# Patient Record
Sex: MALE | Race: WHITE | URBAN - METROPOLITAN AREA
[De-identification: names, ages, dates, MRNs, and addresses within clinical notes are randomized per-mention and may not be internally consistent; named-entity substitution may affect disease eponyms.]

---

## 2020-06-02 ENCOUNTER — TELEPHONE ENCOUNTER (OUTPATIENT)
Dept: URBAN - METROPOLITAN AREA TELEHEALTH 2 | Facility: TELEHEALTH | Age: 20
End: 2020-06-02

## 2020-06-09 ENCOUNTER — TELEPHONE ENCOUNTER (OUTPATIENT)
Dept: URBAN - METROPOLITAN AREA CLINIC 92 | Facility: CLINIC | Age: 20
End: 2020-06-09

## 2020-06-09 RX ORDER — OMEPRAZOLE AND SODIUM BICARBONATE 40; 1680 MG/1; MG/1
ONE PACKET VIA G-TUBE QD POWDER, FOR SUSPENSION ORAL ONCE A DAY
Qty: 90 | Refills: 2
Start: 2020-03-12

## 2020-06-24 ENCOUNTER — OFFICE VISIT (OUTPATIENT)
Dept: RURAL CLINIC 8 | Facility: CLINIC | Age: 20
End: 2020-06-24

## 2020-06-24 RX ORDER — CARBIDOPA TABLETS 25 MG/1
TABLET ORAL
Qty: 0 | Refills: 0 | Status: ACTIVE | COMMUNITY
Start: 1900-01-01

## 2020-06-24 RX ORDER — LEUCOVORIN CALCIUM 10 MG/1
TABLET ORAL
Qty: 0 | Refills: 0 | Status: ACTIVE | COMMUNITY
Start: 1900-01-01

## 2020-06-24 RX ORDER — MONTELUKAST SODIUM 5 MG/1
CHEW 2 TABLETS (10 MG) BY ORAL ROUTE ONCE DAILY IN THE EVENING TABLET, CHEWABLE ORAL 1
Qty: 0 | Refills: 0 | Status: ACTIVE | COMMUNITY
Start: 1900-01-01

## 2020-06-24 RX ORDER — CETIRIZINE HYDROCHLORIDE 5 MG/1
TABLET ORAL
Qty: 0 | Refills: 0 | Status: ACTIVE | COMMUNITY
Start: 1900-01-01

## 2020-06-24 RX ORDER — FLUTICASONE PROPIONATE 50 UG/1
SPRAY, METERED NASAL
Qty: 0 | Refills: 0 | Status: ACTIVE | COMMUNITY
Start: 1900-01-01

## 2020-06-24 RX ORDER — NYSTATIN 100000 [USP'U]/G
APPLY TO THE AFFECTED AREA(S) BY TOPICAL ROUTE 2 TIMES PER DAY CREAM TOPICAL 2
Qty: 1 | Refills: 5 | Status: ACTIVE | COMMUNITY
Start: 2020-02-10

## 2020-06-24 RX ORDER — BUDESONIDE 0.5 MG/2ML
SUSPENSION RESPIRATORY (INHALATION)
Qty: 0 | Refills: 0 | Status: ACTIVE | COMMUNITY
Start: 1900-01-01

## 2020-06-24 RX ORDER — DIAZEPAM 2 MG/1
TABLET ORAL
Qty: 0 | Refills: 0 | Status: ACTIVE | COMMUNITY
Start: 1900-01-01

## 2020-06-24 RX ORDER — OMEPRAZOLE AND SODIUM BICARBONATE 40; 1680 MG/1; MG/1
ONE PACKET VIA G-TUBE QD POWDER, FOR SUSPENSION ORAL ONCE A DAY
Qty: 90 | Refills: 2 | Status: ACTIVE | COMMUNITY
Start: 2020-03-12

## 2020-06-24 RX ORDER — TRIHEXYPHENIDYL HYDROCHLORIDE 2 MG/1
TABLET ORAL
Qty: 0 | Refills: 0 | Status: ACTIVE | COMMUNITY
Start: 1900-01-01

## 2020-06-24 RX ORDER — BACLOFEN 10 MG/1
TABLET ORAL
Qty: 0 | Refills: 0 | Status: ACTIVE | COMMUNITY
Start: 1900-01-01

## 2020-06-24 RX ORDER — ECONAZOLE NITRATE 10 MG/G
APPLY TO THE AFFECTED AND SURROUNDING AREAS OF SKIN BY TOPICAL ROUTE 2 TIMES PER DAY IN THE MORNING AND EVENING CREAM TOPICAL 2
Qty: 1 | Refills: 2 | Status: ACTIVE | COMMUNITY
Start: 2019-11-14

## 2020-06-26 ENCOUNTER — TELEPHONE ENCOUNTER (OUTPATIENT)
Dept: URBAN - METROPOLITAN AREA CLINIC 92 | Facility: CLINIC | Age: 20
End: 2020-06-26

## 2020-06-26 RX ORDER — OMEPRAZOLE AND SODIUM BICARBONATE 40; 1680 MG/1; MG/1
ONE PACKET VIA G-TUBE QD POWDER, FOR SUSPENSION ORAL ONCE A DAY
Qty: 90 | Refills: 2
Start: 2020-03-12

## 2020-06-26 RX ORDER — OMEPRAZOLE AND SODIUM BICARBONATE 40; 1680 MG/1; MG/1
ONE PACKET VIA G-TUBE QD POWDER, FOR SUSPENSION ORAL ONCE A DAY
Qty: 90 | Refills: 3
Start: 2020-03-12

## 2020-06-26 RX ORDER — NYSTATIN 100000 [USP'U]/G
APPLY TO THE AFFECTED AREA(S) BY TOPICAL ROUTE 2 TIMES PER DAY CREAM TOPICAL 2
Qty: 1
Start: 2020-02-10

## 2020-07-08 ENCOUNTER — OFFICE VISIT (OUTPATIENT)
Dept: RURAL CLINIC 8 | Facility: CLINIC | Age: 20
End: 2020-07-08

## 2020-07-08 ENCOUNTER — CLAIMS CREATED FROM THE CLAIM WINDOW (OUTPATIENT)
Dept: RURAL CLINIC 8 | Facility: CLINIC | Age: 20
End: 2020-07-08
Payer: MEDICAID

## 2020-07-08 ENCOUNTER — OFFICE VISIT (OUTPATIENT)
Dept: RURAL CLINIC 8 | Facility: CLINIC | Age: 20
End: 2020-07-08
Payer: MEDICAID

## 2020-07-08 DIAGNOSIS — K21.9 GERD: ICD-10-CM

## 2020-07-08 DIAGNOSIS — Z43.1 ATTENTION TO GASTROSTOMY: ICD-10-CM

## 2020-07-08 DIAGNOSIS — G80.9 CEREBRAL PALSY: ICD-10-CM

## 2020-07-08 DIAGNOSIS — R13.10 DYSPHAGIA: ICD-10-CM

## 2020-07-08 PROCEDURE — G8427 DOCREV CUR MEDS BY ELIG CLIN: HCPCS | Performed by: INTERNAL MEDICINE

## 2020-07-08 PROCEDURE — G8417 CALC BMI ABV UP PARAM F/U: HCPCS | Performed by: INTERNAL MEDICINE

## 2020-07-08 PROCEDURE — G9903 PT SCRN TBCO ID AS NON USER: HCPCS | Performed by: INTERNAL MEDICINE

## 2020-07-08 PROCEDURE — 99213 OFFICE O/P EST LOW 20 MIN: CPT | Performed by: INTERNAL MEDICINE

## 2020-07-08 RX ORDER — ECONAZOLE NITRATE 10 MG/G
APPLY TO THE AFFECTED AND SURROUNDING AREAS OF SKIN BY TOPICAL ROUTE 2 TIMES PER DAY IN THE MORNING AND EVENING CREAM TOPICAL 2
Qty: 1 | Refills: 2 | Status: ACTIVE | COMMUNITY
Start: 2019-11-14

## 2020-07-08 RX ORDER — TRIHEXYPHENIDYL HYDROCHLORIDE 2 MG/1
TABLET ORAL
Qty: 0 | Refills: 0 | Status: ACTIVE | COMMUNITY
Start: 1900-01-01

## 2020-07-08 RX ORDER — BACLOFEN 10 MG/1
TABLET ORAL
Qty: 0 | Refills: 0 | Status: DISCONTINUED | COMMUNITY
Start: 1900-01-01

## 2020-07-08 RX ORDER — NYSTATIN 100000 [USP'U]/G
APPLY TO THE AFFECTED AREA(S) BY TOPICAL ROUTE 2 TIMES PER DAY CREAM TOPICAL 2
Qty: 1 | Status: DISCONTINUED | COMMUNITY
Start: 2020-02-10

## 2020-07-08 RX ORDER — CETIRIZINE HYDROCHLORIDE 5 MG/1
TABLET ORAL
Qty: 0 | Refills: 0 | Status: ACTIVE | COMMUNITY
Start: 1900-01-01

## 2020-07-08 RX ORDER — DIAZEPAM 2 MG/1
TABLET ORAL
Qty: 0 | Refills: 0 | Status: ACTIVE | COMMUNITY
Start: 1900-01-01

## 2020-07-08 RX ORDER — FLUTICASONE PROPIONATE 50 UG/1
SPRAY, METERED NASAL
Qty: 0 | Refills: 0 | Status: ACTIVE | COMMUNITY
Start: 1900-01-01

## 2020-07-08 RX ORDER — BUDESONIDE 0.5 MG/2ML
SUSPENSION RESPIRATORY (INHALATION)
Qty: 0 | Refills: 0 | Status: ACTIVE | COMMUNITY
Start: 1900-01-01

## 2020-07-08 RX ORDER — LEUCOVORIN CALCIUM 10 MG/1
TABLET ORAL
Qty: 0 | Refills: 0 | Status: ACTIVE | COMMUNITY
Start: 1900-01-01

## 2020-07-08 RX ORDER — CARBIDOPA TABLETS 25 MG/1
TABLET ORAL
Qty: 0 | Refills: 0 | Status: ACTIVE | COMMUNITY
Start: 1900-01-01

## 2020-07-08 RX ORDER — OMEPRAZOLE AND SODIUM BICARBONATE 40; 1680 MG/1; MG/1
ONE PACKET VIA G-TUBE QD POWDER, FOR SUSPENSION ORAL ONCE A DAY
Qty: 90 | Refills: 2 | Status: ACTIVE | COMMUNITY
Start: 2020-03-12

## 2020-07-08 RX ORDER — MONTELUKAST SODIUM 5 MG/1
CHEW 2 TABLETS (10 MG) BY ORAL ROUTE ONCE DAILY IN THE EVENING TABLET, CHEWABLE ORAL 1
Qty: 0 | Refills: 0 | Status: ACTIVE | COMMUNITY
Start: 1900-01-01

## 2020-07-08 NOTE — HPI-TODAY'S VISIT:
Karson comes to the office today with his mother and grandfather in follow-up.  He has advanced cerebral palsy and is dependent on a button PEG tube for feedings.  Comes for PEG tube exchange.

## 2020-10-21 ENCOUNTER — OFFICE VISIT (OUTPATIENT)
Dept: RURAL CLINIC 8 | Facility: CLINIC | Age: 20
End: 2020-10-21
Payer: MEDICAID

## 2020-10-21 DIAGNOSIS — Z43.1 ATTENTION TO GASTROSTOMY TUBE: ICD-10-CM

## 2020-10-21 DIAGNOSIS — G80.9 CEREBRAL PALSY: ICD-10-CM

## 2020-10-21 DIAGNOSIS — R13.10 DYSPHAGIA: ICD-10-CM

## 2020-10-21 DIAGNOSIS — K21.9 GERD: ICD-10-CM

## 2020-10-21 PROCEDURE — G8484 FLU IMMUNIZE NO ADMIN: HCPCS | Performed by: INTERNAL MEDICINE

## 2020-10-21 PROCEDURE — G9903 PT SCRN TBCO ID AS NON USER: HCPCS | Performed by: INTERNAL MEDICINE

## 2020-10-21 PROCEDURE — G8420 CALC BMI NORM PARAMETERS: HCPCS | Performed by: INTERNAL MEDICINE

## 2020-10-21 PROCEDURE — G8427 DOCREV CUR MEDS BY ELIG CLIN: HCPCS | Performed by: INTERNAL MEDICINE

## 2020-10-21 PROCEDURE — 99213 OFFICE O/P EST LOW 20 MIN: CPT | Performed by: INTERNAL MEDICINE

## 2020-10-21 RX ORDER — CETIRIZINE HYDROCHLORIDE 5 MG/1
TABLET ORAL
Qty: 0 | Refills: 0 | Status: ACTIVE | COMMUNITY
Start: 1900-01-01

## 2020-10-21 RX ORDER — CARBIDOPA TABLETS 25 MG/1
TABLET ORAL
Qty: 0 | Refills: 0 | Status: ACTIVE | COMMUNITY
Start: 1900-01-01

## 2020-10-21 RX ORDER — LEUCOVORIN CALCIUM 10 MG/1
TABLET ORAL
Qty: 0 | Refills: 0 | Status: ACTIVE | COMMUNITY
Start: 1900-01-01

## 2020-10-21 RX ORDER — MONTELUKAST SODIUM 5 MG/1
CHEW 2 TABLETS (10 MG) BY ORAL ROUTE ONCE DAILY IN THE EVENING TABLET, CHEWABLE ORAL 1
Qty: 0 | Refills: 0 | Status: ACTIVE | COMMUNITY
Start: 1900-01-01

## 2020-10-21 RX ORDER — FLUTICASONE PROPIONATE 50 UG/1
SPRAY, METERED NASAL
Qty: 0 | Refills: 0 | Status: ACTIVE | COMMUNITY
Start: 1900-01-01

## 2020-10-21 RX ORDER — ECONAZOLE NITRATE 10 MG/G
APPLY TO THE AFFECTED AND SURROUNDING AREAS OF SKIN BY TOPICAL ROUTE 2 TIMES PER DAY IN THE MORNING AND EVENING CREAM TOPICAL 2
Qty: 1 | Refills: 2 | Status: ACTIVE | COMMUNITY
Start: 2019-11-14

## 2020-10-21 RX ORDER — TRIHEXYPHENIDYL HYDROCHLORIDE 2 MG/1
TABLET ORAL
Qty: 0 | Refills: 0 | Status: ACTIVE | COMMUNITY
Start: 1900-01-01

## 2020-10-21 RX ORDER — OMEPRAZOLE AND SODIUM BICARBONATE 40; 1680 MG/1; MG/1
ONE PACKET VIA G-TUBE QD POWDER, FOR SUSPENSION ORAL ONCE A DAY
Qty: 90 | Refills: 2 | Status: ACTIVE | COMMUNITY
Start: 2020-03-12

## 2020-10-21 RX ORDER — DIAZEPAM 2 MG/1
TABLET ORAL
Qty: 0 | Refills: 0 | Status: ACTIVE | COMMUNITY
Start: 1900-01-01

## 2020-10-21 RX ORDER — BUDESONIDE 0.5 MG/2ML
SUSPENSION RESPIRATORY (INHALATION)
Qty: 0 | Refills: 0 | Status: ACTIVE | COMMUNITY
Start: 1900-01-01

## 2020-10-21 NOTE — HPI-TODAY'S VISIT:
The patient is seen today in the office with his mother and grandfather.  He has fairly advanced cerebral palsy and is dependent on a button gastrostomy for feedings.  He is doing well.

## 2021-01-13 ENCOUNTER — OFFICE VISIT (OUTPATIENT)
Dept: RURAL CLINIC 8 | Facility: CLINIC | Age: 21
End: 2021-01-13
Payer: MEDICAID

## 2021-01-13 ENCOUNTER — WEB ENCOUNTER (OUTPATIENT)
Dept: RURAL CLINIC 8 | Facility: CLINIC | Age: 21
End: 2021-01-13

## 2021-01-13 DIAGNOSIS — R13.19 CERVICAL DYSPHAGIA: ICD-10-CM

## 2021-01-13 DIAGNOSIS — Z43.1 ATTENTION TO GASTROSTOMY: ICD-10-CM

## 2021-01-13 DIAGNOSIS — K21.9 GERD: ICD-10-CM

## 2021-01-13 DIAGNOSIS — G80.9 CEREBRAL PALSY: ICD-10-CM

## 2021-01-13 PROBLEM — 40739000 DYSPHAGIA: Status: ACTIVE | Noted: 2020-10-21

## 2021-01-13 PROBLEM — 128188000 CEREBRAL PALSY: Status: ACTIVE | Noted: 2020-10-21

## 2021-01-13 PROCEDURE — 43762 RPLC GTUBE NO REVJ TRC: CPT | Performed by: INTERNAL MEDICINE

## 2021-01-13 PROCEDURE — G8417 CALC BMI ABV UP PARAM F/U: HCPCS | Performed by: INTERNAL MEDICINE

## 2021-01-13 PROCEDURE — 49446 CHANGE G-TUBE TO G-J PERC: CPT | Performed by: INTERNAL MEDICINE

## 2021-01-13 PROCEDURE — G9903 PT SCRN TBCO ID AS NON USER: HCPCS | Performed by: INTERNAL MEDICINE

## 2021-01-13 PROCEDURE — 91299 UNLISTED DX GI PROCEDURE: CPT | Performed by: INTERNAL MEDICINE

## 2021-01-13 PROCEDURE — G8482 FLU IMMUNIZE ORDER/ADMIN: HCPCS | Performed by: INTERNAL MEDICINE

## 2021-01-13 PROCEDURE — G8427 DOCREV CUR MEDS BY ELIG CLIN: HCPCS | Performed by: INTERNAL MEDICINE

## 2021-01-13 PROCEDURE — 1036F TOBACCO NON-USER: CPT | Performed by: INTERNAL MEDICINE

## 2021-01-13 PROCEDURE — 99213 OFFICE O/P EST LOW 20 MIN: CPT | Performed by: INTERNAL MEDICINE

## 2021-01-13 RX ORDER — OMEPRAZOLE AND SODIUM BICARBONATE 40; 1680 MG/1; MG/1
ONE PACKET VIA G-TUBE QD POWDER, FOR SUSPENSION ORAL ONCE A DAY
Qty: 90 | Refills: 2 | Status: ACTIVE | COMMUNITY
Start: 2020-03-12

## 2021-01-13 RX ORDER — CARBIDOPA TABLETS 25 MG/1
TABLET ORAL
Qty: 0 | Refills: 0 | Status: ACTIVE | COMMUNITY
Start: 1900-01-01

## 2021-01-13 RX ORDER — LEUCOVORIN CALCIUM 10 MG/1
TABLET ORAL
Qty: 0 | Refills: 0 | Status: ACTIVE | COMMUNITY
Start: 1900-01-01

## 2021-01-13 RX ORDER — FLUTICASONE PROPIONATE 50 UG/1
SPRAY, METERED NASAL
Qty: 0 | Refills: 0 | Status: ACTIVE | COMMUNITY
Start: 1900-01-01

## 2021-01-13 RX ORDER — CETIRIZINE HYDROCHLORIDE 5 MG/1
TABLET ORAL
Qty: 0 | Refills: 0 | Status: ACTIVE | COMMUNITY
Start: 1900-01-01

## 2021-01-13 RX ORDER — DIAZEPAM 2 MG/1
TABLET ORAL
Qty: 0 | Refills: 0 | Status: ACTIVE | COMMUNITY
Start: 1900-01-01

## 2021-01-13 RX ORDER — ECONAZOLE NITRATE 10 MG/G
APPLY TO THE AFFECTED AND SURROUNDING AREAS OF SKIN BY TOPICAL ROUTE 2 TIMES PER DAY IN THE MORNING AND EVENING CREAM TOPICAL 2
Qty: 1 | Refills: 2 | Status: ACTIVE | COMMUNITY
Start: 2019-11-14

## 2021-01-13 RX ORDER — BUDESONIDE 0.5 MG/2ML
SUSPENSION RESPIRATORY (INHALATION)
Qty: 0 | Refills: 0 | Status: ACTIVE | COMMUNITY
Start: 1900-01-01

## 2021-01-13 RX ORDER — MONTELUKAST SODIUM 5 MG/1
CHEW 2 TABLETS (10 MG) BY ORAL ROUTE ONCE DAILY IN THE EVENING TABLET, CHEWABLE ORAL 1
Qty: 0 | Refills: 0 | Status: ACTIVE | COMMUNITY
Start: 1900-01-01

## 2021-01-13 RX ORDER — TRIHEXYPHENIDYL HYDROCHLORIDE 2 MG/1
TABLET ORAL
Qty: 0 | Refills: 0 | Status: ACTIVE | COMMUNITY
Start: 1900-01-01

## 2021-01-13 NOTE — HPI-TODAY'S VISIT:
patient comes to the [place] office today with his mother and grandfather.  He has significantly advanced cerebral palsy.  He is depended on a button type gastrostomy for feedings.  He is doing actually quite well.  He is up to 139 lb.  He is tolerating tube feedings very well.  Not having any issues.  He does take  Zegerid daily via the gastrostomy.
18-Dec-2020 14:17

## 2021-01-25 ENCOUNTER — ERX REFILL RESPONSE (OUTPATIENT)
Dept: URBAN - METROPOLITAN AREA CLINIC 92 | Facility: CLINIC | Age: 21
End: 2021-01-25

## 2021-01-25 RX ORDER — OMEPRAZOLE AND SODIUM BICARBONATE 40; 1680 MG/1; MG/1
ONE PACKET VIA G-TUBE QD POWDER, FOR SUSPENSION ORAL ONCE A DAY
Qty: 90 | Refills: 3

## 2021-04-21 ENCOUNTER — OFFICE VISIT (OUTPATIENT)
Dept: RURAL CLINIC 8 | Facility: CLINIC | Age: 21
End: 2021-04-21
Payer: MEDICAID

## 2021-04-21 DIAGNOSIS — G40.909 SEIZURE DISORDER: ICD-10-CM

## 2021-04-21 DIAGNOSIS — K21.9 GASTROESOPHAGEAL REFLUX DISEASE, UNSPECIFIED WHETHER ESOPHAGITIS PRESENT: ICD-10-CM

## 2021-04-21 DIAGNOSIS — Z43.1 ATTENTION TO GASTROSTOMY: ICD-10-CM

## 2021-04-21 DIAGNOSIS — G80.1 SPASTIC DIPLEGIC CEREBRAL PALSY: ICD-10-CM

## 2021-04-21 PROBLEM — 58193001: Status: ACTIVE | Noted: 2021-04-21

## 2021-04-21 PROCEDURE — 99212 OFFICE O/P EST SF 10 MIN: CPT | Performed by: INTERNAL MEDICINE

## 2021-04-21 PROCEDURE — 43763 RPLC GTUBE REVJ GSTRST TRC: CPT | Performed by: INTERNAL MEDICINE

## 2021-04-21 RX ORDER — MONTELUKAST SODIUM 5 MG/1
CHEW 2 TABLETS (10 MG) BY ORAL ROUTE ONCE DAILY IN THE EVENING TABLET, CHEWABLE ORAL 1
Qty: 0 | Refills: 0 | Status: ACTIVE | COMMUNITY
Start: 1900-01-01

## 2021-04-21 RX ORDER — CARBIDOPA TABLETS 25 MG/1
TABLET ORAL
Qty: 0 | Refills: 0 | Status: ACTIVE | COMMUNITY
Start: 1900-01-01

## 2021-04-21 RX ORDER — CETIRIZINE HYDROCHLORIDE 5 MG/1
TABLET ORAL
Qty: 0 | Refills: 0 | Status: ACTIVE | COMMUNITY
Start: 1900-01-01

## 2021-04-21 RX ORDER — ECONAZOLE NITRATE 10 MG/G
APPLY TO THE AFFECTED AND SURROUNDING AREAS OF SKIN BY TOPICAL ROUTE 2 TIMES PER DAY IN THE MORNING AND EVENING CREAM TOPICAL 2
Qty: 1 | Refills: 2 | Status: ACTIVE | COMMUNITY
Start: 2019-11-14

## 2021-04-21 RX ORDER — TRIHEXYPHENIDYL HYDROCHLORIDE 2 MG/1
TABLET ORAL
Qty: 0 | Refills: 0 | Status: ACTIVE | COMMUNITY
Start: 1900-01-01

## 2021-04-21 RX ORDER — LEUCOVORIN CALCIUM 10 MG/1
TABLET ORAL
Qty: 0 | Refills: 0 | Status: ACTIVE | COMMUNITY
Start: 1900-01-01

## 2021-04-21 RX ORDER — OMEPRAZOLE AND SODIUM BICARBONATE 40; 1680 MG/1; MG/1
ONE PACKET VIA G-TUBE QD POWDER, FOR SUSPENSION ORAL ONCE A DAY
Qty: 90 | Refills: 3 | Status: ACTIVE | COMMUNITY

## 2021-04-21 RX ORDER — NYSTATIN 100000 [USP'U]/G
1 APPLICATION CREAM TOPICAL
Qty: 1 TUBE | Refills: 6 | OUTPATIENT
Start: 2021-04-21 | End: 2021-11-16

## 2021-04-21 RX ORDER — BUDESONIDE 0.5 MG/2ML
SUSPENSION RESPIRATORY (INHALATION)
Qty: 0 | Refills: 0 | Status: ACTIVE | COMMUNITY
Start: 1900-01-01

## 2021-04-21 RX ORDER — DIAZEPAM 2 MG/1
TABLET ORAL
Qty: 0 | Refills: 0 | Status: ACTIVE | COMMUNITY
Start: 1900-01-01

## 2021-04-21 RX ORDER — FLUTICASONE PROPIONATE 50 UG/1
SPRAY, METERED NASAL
Qty: 0 | Refills: 0 | Status: ACTIVE | COMMUNITY
Start: 1900-01-01

## 2021-04-21 NOTE — HPI-TODAY'S VISIT:
patient returns to the office with his grandfather for evaluation of gastrostomy change.  He has advanced cerebral palsy and is dependent on a button type gastrostomy for feedings.  He also has significant gastric acid reflux disease and is on is Zegerid.

## 2021-04-28 ENCOUNTER — OFFICE VISIT (OUTPATIENT)
Dept: RURAL CLINIC 8 | Facility: CLINIC | Age: 21
End: 2021-04-28

## 2021-08-25 ENCOUNTER — OFFICE VISIT (OUTPATIENT)
Dept: RURAL CLINIC 8 | Facility: CLINIC | Age: 21
End: 2021-08-25
Payer: MEDICAID

## 2021-08-25 DIAGNOSIS — R13.12 OROPHARYNGEAL DYSPHAGIA: ICD-10-CM

## 2021-08-25 DIAGNOSIS — G40.909 SEIZURE DISORDER: ICD-10-CM

## 2021-08-25 DIAGNOSIS — M62.838 MUSCLE SPASTICITY: ICD-10-CM

## 2021-08-25 DIAGNOSIS — G80.9 CEREBRAL PALSY, UNSPECIFIED TYPE: ICD-10-CM

## 2021-08-25 DIAGNOSIS — Z43.1 ATTENTION TO GASTROSTOMY: ICD-10-CM

## 2021-08-25 PROCEDURE — 99213 OFFICE O/P EST LOW 20 MIN: CPT | Performed by: INTERNAL MEDICINE

## 2021-08-25 PROCEDURE — 43763 RPLC GTUBE REVJ GSTRST TRC: CPT | Performed by: INTERNAL MEDICINE

## 2021-08-25 RX ORDER — OMEPRAZOLE AND SODIUM BICARBONATE 40; 1680 MG/1; MG/1
ONE PACKET VIA G-TUBE QD POWDER, FOR SUSPENSION ORAL ONCE A DAY
Qty: 90 | Refills: 3 | Status: ACTIVE | COMMUNITY

## 2021-08-25 RX ORDER — NYSTATIN 100000 [USP'U]/G
1 APPLICATION CREAM TOPICAL
Qty: 1 TUBE | Refills: 6 | Status: ACTIVE | COMMUNITY
Start: 2021-04-21 | End: 2021-11-16

## 2021-08-25 RX ORDER — LEUCOVORIN CALCIUM 10 MG/1
TABLET ORAL
Qty: 0 | Refills: 0 | Status: ACTIVE | COMMUNITY
Start: 1900-01-01

## 2021-08-25 RX ORDER — CETIRIZINE HYDROCHLORIDE 5 MG/1
TABLET ORAL
Qty: 0 | Refills: 0 | Status: ACTIVE | COMMUNITY
Start: 1900-01-01

## 2021-08-25 RX ORDER — TRIHEXYPHENIDYL HYDROCHLORIDE 2 MG/1
TABLET ORAL
Qty: 0 | Refills: 0 | Status: ACTIVE | COMMUNITY
Start: 1900-01-01

## 2021-08-25 RX ORDER — FLUTICASONE PROPIONATE 50 UG/1
SPRAY, METERED NASAL
Qty: 0 | Refills: 0 | Status: ACTIVE | COMMUNITY
Start: 1900-01-01

## 2021-08-25 RX ORDER — ECONAZOLE NITRATE 10 MG/G
APPLY TO THE AFFECTED AND SURROUNDING AREAS OF SKIN BY TOPICAL ROUTE 2 TIMES PER DAY IN THE MORNING AND EVENING CREAM TOPICAL 2
Qty: 1 | Refills: 2 | Status: ACTIVE | COMMUNITY
Start: 2019-11-14

## 2021-08-25 RX ORDER — DIAZEPAM 2 MG/1
TABLET ORAL
Qty: 0 | Refills: 0 | Status: ACTIVE | COMMUNITY
Start: 1900-01-01

## 2021-08-25 RX ORDER — BUDESONIDE 0.5 MG/2ML
SUSPENSION RESPIRATORY (INHALATION)
Qty: 0 | Refills: 0 | Status: ACTIVE | COMMUNITY
Start: 1900-01-01

## 2021-08-25 RX ORDER — MONTELUKAST SODIUM 5 MG/1
CHEW 2 TABLETS (10 MG) BY ORAL ROUTE ONCE DAILY IN THE EVENING TABLET, CHEWABLE ORAL 1
Qty: 0 | Refills: 0 | Status: ACTIVE | COMMUNITY
Start: 1900-01-01

## 2021-08-25 RX ORDER — CARBIDOPA TABLETS 25 MG/1
TABLET ORAL
Qty: 0 | Refills: 0 | Status: ACTIVE | COMMUNITY
Start: 1900-01-01

## 2021-08-25 NOTE — PHYSICAL EXAM GASTROINTESTINAL
Abdomen , soft, nontender, nondistended , no guarding or rigidity , no masses palpable , normal bowel sounds , Liver and Spleen , no hepatomegaly present , no hepatosplenomegaly , liver nontender , spleen not palpable  button peg in LUQ. c/d i

## 2021-08-25 NOTE — HPI-TODAY'S VISIT:
Karson comes today with his mother and grandfather. he has significant cerebral palsy, is noncommunicative. he has a Button PEG for feeding and medications. he is on 60mg free water with [employer] HN 250cc five times a day. there is 300 calories in each feeding for a total of 1500 calories a day. - his family does give him occasionally a few soft things like yogurt or banana pudding. he is able to swallow that with no problems. he does not cough or get choked. he is on zegerid daily for acid reflux. has not had any problems with constipation. does miralax every day to facilitate stooling.

## 2021-10-13 ENCOUNTER — ERX REFILL RESPONSE (OUTPATIENT)
Dept: RURAL CLINIC 2 | Facility: CLINIC | Age: 21
End: 2021-10-13

## 2021-10-13 RX ORDER — OMEPRAZOLE/SODIUM BICARBONATE 40; 1680 MG/1; MG/1
GIVE ONE PACKET VIA G-TUBE EVERY DAY POWDER, FOR SUSPENSION ORAL
Qty: 30 PACKET | Refills: 4 | OUTPATIENT

## 2021-10-13 RX ORDER — NYSTATIN 100000 [USP'U]/G
1 APPLICATION CREAM TOPICAL
Qty: 1 TUBE | Refills: 6 | OUTPATIENT

## 2021-10-13 RX ORDER — NYSTATIN CREAM 100000 [USP'U]/G
APPLY TO THE AFFECTED AREA AROUND GASTROSTOMY TWICE DAILY AS NEEDED CREAM TOPICAL
Qty: 30 GRAM | Refills: 7 | OUTPATIENT

## 2021-10-13 RX ORDER — OMEPRAZOLE AND SODIUM BICARBONATE 40; 1680 MG/1; MG/1
ONE PACKET VIA G-TUBE QD POWDER, FOR SUSPENSION ORAL ONCE A DAY
Qty: 90 | Refills: 3 | OUTPATIENT

## 2021-11-10 ENCOUNTER — OFFICE VISIT (OUTPATIENT)
Dept: RURAL CLINIC 8 | Facility: CLINIC | Age: 21
End: 2021-11-10
Payer: MEDICAID

## 2021-11-10 DIAGNOSIS — G80.9 CEREBRAL PALSY, UNSPECIFIED TYPE: ICD-10-CM

## 2021-11-10 DIAGNOSIS — R13.11 ORAL PHASE DYSPHAGIA: ICD-10-CM

## 2021-11-10 DIAGNOSIS — K21.9 GERD WITHOUT ESOPHAGITIS: ICD-10-CM

## 2021-11-10 DIAGNOSIS — Z43.1 ATTENTION TO GASTROSTOMY: ICD-10-CM

## 2021-11-10 PROCEDURE — 99213 OFFICE O/P EST LOW 20 MIN: CPT | Performed by: INTERNAL MEDICINE

## 2021-11-10 PROCEDURE — 43763 RPLC GTUBE REVJ GSTRST TRC: CPT | Performed by: INTERNAL MEDICINE

## 2021-11-10 RX ORDER — ECONAZOLE NITRATE 10 MG/G
APPLY TO THE AFFECTED AND SURROUNDING AREAS OF SKIN BY TOPICAL ROUTE 2 TIMES PER DAY IN THE MORNING AND EVENING CREAM TOPICAL 2
Qty: 1 | Refills: 2 | Status: ACTIVE | COMMUNITY
Start: 2019-11-14

## 2021-11-10 RX ORDER — BUDESONIDE 0.5 MG/2ML
SUSPENSION RESPIRATORY (INHALATION)
Qty: 0 | Refills: 0 | Status: ACTIVE | COMMUNITY
Start: 1900-01-01

## 2021-11-10 RX ORDER — LEUCOVORIN CALCIUM 10 MG/1
TABLET ORAL
Qty: 0 | Refills: 0 | Status: ACTIVE | COMMUNITY
Start: 1900-01-01

## 2021-11-10 RX ORDER — TRIHEXYPHENIDYL HYDROCHLORIDE 2 MG/1
TABLET ORAL
Qty: 0 | Refills: 0 | Status: ACTIVE | COMMUNITY
Start: 1900-01-01

## 2021-11-10 RX ORDER — NYSTATIN CREAM 100000 [USP'U]/G
APPLY TO THE AFFECTED AREA AROUND GASTROSTOMY TWICE DAILY AS NEEDED CREAM TOPICAL
Qty: 30 GRAM | Refills: 7 | Status: ACTIVE | COMMUNITY

## 2021-11-10 RX ORDER — CARBIDOPA TABLETS 25 MG/1
TABLET ORAL
Qty: 0 | Refills: 0 | Status: ACTIVE | COMMUNITY
Start: 1900-01-01

## 2021-11-10 RX ORDER — CETIRIZINE HYDROCHLORIDE 5 MG/1
TABLET ORAL
Qty: 0 | Refills: 0 | Status: ACTIVE | COMMUNITY
Start: 1900-01-01

## 2021-11-10 RX ORDER — MONTELUKAST SODIUM 5 MG/1
CHEW 2 TABLETS (10 MG) BY ORAL ROUTE ONCE DAILY IN THE EVENING TABLET, CHEWABLE ORAL 1
Qty: 0 | Refills: 0 | Status: ACTIVE | COMMUNITY
Start: 1900-01-01

## 2021-11-10 RX ORDER — DIAZEPAM 2 MG/1
TABLET ORAL
Qty: 0 | Refills: 0 | Status: ACTIVE | COMMUNITY
Start: 1900-01-01

## 2021-11-10 RX ORDER — FLUTICASONE PROPIONATE 50 UG/1
SPRAY, METERED NASAL
Qty: 0 | Refills: 0 | Status: ACTIVE | COMMUNITY
Start: 1900-01-01

## 2021-11-10 RX ORDER — OMEPRAZOLE/SODIUM BICARBONATE 40; 1680 MG/1; MG/1
GIVE ONE PACKET VIA G-TUBE EVERY DAY POWDER, FOR SUSPENSION ORAL
Qty: 30 PACKET | Refills: 4 | Status: ACTIVE | COMMUNITY

## 2021-11-10 NOTE — HPI-TODAY'S VISIT:
patient has significant cerebral palsy with dysphagia and is dependent on a button type gastrostomy for feedings.  He returns to the office today for follow-up in to exchange his gastrostomy.  He is accompanied by his mom and grandfather today.  GERD seems to be well controlled zegerid

## 2021-11-24 ENCOUNTER — OFFICE VISIT (OUTPATIENT)
Dept: RURAL CLINIC 8 | Facility: CLINIC | Age: 21
End: 2021-11-24

## 2022-02-09 ENCOUNTER — OFFICE VISIT (OUTPATIENT)
Dept: RURAL CLINIC 8 | Facility: CLINIC | Age: 22
End: 2022-02-09
Payer: MEDICAID

## 2022-02-09 DIAGNOSIS — R13.12 OROPHARYNGEAL DYSPHAGIA: ICD-10-CM

## 2022-02-09 DIAGNOSIS — Z43.1 ATTENTION TO GASTROSTOMY TUBE: ICD-10-CM

## 2022-02-09 DIAGNOSIS — K21.9 GERD WITHOUT ESOPHAGITIS: ICD-10-CM

## 2022-02-09 DIAGNOSIS — G80.0 SPASTIC QUADRIPLEGIC CEREBRAL PALSY: ICD-10-CM

## 2022-02-09 PROCEDURE — 43763 RPLC GTUBE REVJ GSTRST TRC: CPT | Performed by: INTERNAL MEDICINE

## 2022-02-09 PROCEDURE — 99213 OFFICE O/P EST LOW 20 MIN: CPT | Performed by: INTERNAL MEDICINE

## 2022-02-09 RX ORDER — TRIHEXYPHENIDYL HYDROCHLORIDE 2 MG/1
TABLET ORAL
Qty: 0 | Refills: 0 | Status: ACTIVE | COMMUNITY
Start: 1900-01-01

## 2022-02-09 RX ORDER — DIAZEPAM 2 MG/1
TABLET ORAL
Qty: 0 | Refills: 0 | Status: ACTIVE | COMMUNITY
Start: 1900-01-01

## 2022-02-09 RX ORDER — CETIRIZINE HYDROCHLORIDE 5 MG/1
TABLET ORAL
Qty: 0 | Refills: 0 | Status: ACTIVE | COMMUNITY
Start: 1900-01-01

## 2022-02-09 RX ORDER — FLUTICASONE PROPIONATE 50 UG/1
SPRAY, METERED NASAL
Qty: 0 | Refills: 0 | Status: ACTIVE | COMMUNITY
Start: 1900-01-01

## 2022-02-09 RX ORDER — ECONAZOLE NITRATE 10 MG/G
APPLY TO THE AFFECTED AND SURROUNDING AREAS OF SKIN BY TOPICAL ROUTE 2 TIMES PER DAY IN THE MORNING AND EVENING CREAM TOPICAL 2
Qty: 1 | Refills: 2 | Status: ACTIVE | COMMUNITY
Start: 2019-11-14

## 2022-02-09 RX ORDER — LEUCOVORIN CALCIUM 10 MG/1
TABLET ORAL
Qty: 0 | Refills: 0 | Status: ACTIVE | COMMUNITY
Start: 1900-01-01

## 2022-02-09 RX ORDER — OMEPRAZOLE/SODIUM BICARBONATE 40; 1680 MG/1; MG/1
GIVE ONE PACKET VIA G-TUBE EVERY DAY POWDER, FOR SUSPENSION ORAL
Qty: 30 PACKET | Refills: 4 | Status: ACTIVE | COMMUNITY

## 2022-02-09 RX ORDER — BUDESONIDE 0.5 MG/2ML
SUSPENSION RESPIRATORY (INHALATION)
Qty: 0 | Refills: 0 | Status: ACTIVE | COMMUNITY
Start: 1900-01-01

## 2022-02-09 RX ORDER — CARBIDOPA TABLETS 25 MG/1
TABLET ORAL
Qty: 0 | Refills: 0 | Status: ACTIVE | COMMUNITY
Start: 1900-01-01

## 2022-02-09 RX ORDER — NYSTATIN CREAM 100000 [USP'U]/G
APPLY TO THE AFFECTED AREA AROUND GASTROSTOMY TWICE DAILY AS NEEDED CREAM TOPICAL
Qty: 30 GRAM | Refills: 7 | Status: ACTIVE | COMMUNITY

## 2022-02-09 RX ORDER — MONTELUKAST SODIUM 5 MG/1
CHEW 2 TABLETS (10 MG) BY ORAL ROUTE ONCE DAILY IN THE EVENING TABLET, CHEWABLE ORAL 1
Qty: 0 | Refills: 0 | Status: ACTIVE | COMMUNITY
Start: 1900-01-01

## 2022-02-10 ENCOUNTER — ERX REFILL RESPONSE (OUTPATIENT)
Dept: RURAL CLINIC 2 | Facility: CLINIC | Age: 22
End: 2022-02-10

## 2022-02-10 RX ORDER — OMEPRAZOLE/SODIUM BICARBONATE 40; 1680 MG/1; MG/1
GIVE ONE PACKET VIA G-TUBE EVERY DAY POWDER, FOR SUSPENSION ORAL
Qty: 30 PACKET | Refills: 4 | OUTPATIENT

## 2022-02-10 RX ORDER — OMEPRAZOLE/SODIUM BICARBONATE 40; 1680 MG/1; MG/1
GIVE ONE PACKET VIA G-TUBE EVERY DAY 30 POWDER, FOR SUSPENSION ORAL
Qty: 30 PACKET | Refills: 4 | OUTPATIENT

## 2022-05-11 ENCOUNTER — OFFICE VISIT (OUTPATIENT)
Dept: RURAL CLINIC 8 | Facility: CLINIC | Age: 22
End: 2022-05-11

## 2022-05-11 RX ORDER — LEUCOVORIN CALCIUM 10 MG/1
TABLET ORAL
Qty: 0 | Refills: 0 | COMMUNITY
Start: 1900-01-01

## 2022-05-11 RX ORDER — FLUTICASONE PROPIONATE 50 UG/1
SPRAY, METERED NASAL
Qty: 0 | Refills: 0 | COMMUNITY
Start: 1900-01-01

## 2022-05-11 RX ORDER — ECONAZOLE NITRATE 10 MG/G
APPLY TO THE AFFECTED AND SURROUNDING AREAS OF SKIN BY TOPICAL ROUTE 2 TIMES PER DAY IN THE MORNING AND EVENING CREAM TOPICAL 2
Qty: 1 | Refills: 2 | COMMUNITY
Start: 2019-11-14

## 2022-05-11 RX ORDER — DIAZEPAM 2 MG/1
TABLET ORAL
Qty: 0 | Refills: 0 | COMMUNITY
Start: 1900-01-01

## 2022-05-11 RX ORDER — TRIHEXYPHENIDYL HYDROCHLORIDE 2 MG/1
TABLET ORAL
Qty: 0 | Refills: 0 | COMMUNITY
Start: 1900-01-01

## 2022-05-11 RX ORDER — NYSTATIN CREAM 100000 [USP'U]/G
APPLY TO THE AFFECTED AREA AROUND GASTROSTOMY TWICE DAILY AS NEEDED CREAM TOPICAL
Qty: 30 GRAM | Refills: 7 | COMMUNITY

## 2022-05-11 RX ORDER — CETIRIZINE HYDROCHLORIDE 5 MG/1
TABLET ORAL
Qty: 0 | Refills: 0 | COMMUNITY
Start: 1900-01-01

## 2022-05-11 RX ORDER — CARBIDOPA TABLETS 25 MG/1
TABLET ORAL
Qty: 0 | Refills: 0 | COMMUNITY
Start: 1900-01-01

## 2022-05-11 RX ORDER — OMEPRAZOLE/SODIUM BICARBONATE 40; 1680 MG/1; MG/1
GIVE ONE PACKET VIA G-TUBE EVERY DAY 30 POWDER, FOR SUSPENSION ORAL
Qty: 30 PACKET | Refills: 4 | COMMUNITY

## 2022-05-11 RX ORDER — BUDESONIDE 0.5 MG/2ML
SUSPENSION RESPIRATORY (INHALATION)
Qty: 0 | Refills: 0 | COMMUNITY
Start: 1900-01-01

## 2022-05-11 RX ORDER — MONTELUKAST SODIUM 5 MG/1
CHEW 2 TABLETS (10 MG) BY ORAL ROUTE ONCE DAILY IN THE EVENING TABLET, CHEWABLE ORAL 1
Qty: 0 | Refills: 0 | COMMUNITY
Start: 1900-01-01

## 2022-05-17 ENCOUNTER — ERX REFILL RESPONSE (OUTPATIENT)
Dept: RURAL CLINIC 2 | Facility: CLINIC | Age: 22
End: 2022-05-17

## 2022-05-17 RX ORDER — NYSTATIN CREAM 100000 [USP'U]/G
APPLY TO THE AFFECTED AREA AROUND GASTROSTOMY TWICE DAILY AS NEEDED CREAM TOPICAL
Qty: 30 GRAM | Refills: 7 | OUTPATIENT

## 2022-05-17 RX ORDER — NYSTATIN CREAM 100000 [USP'U]/G
APPLY TO THE AFFECTED AREA AROUND GASTROSTOMY TWICE DAILY AS NEEDED 30 CREAM TOPICAL
Qty: 30 GRAM | Refills: 7 | OUTPATIENT

## 2022-07-12 ENCOUNTER — ERX REFILL RESPONSE (OUTPATIENT)
Dept: RURAL CLINIC 2 | Facility: CLINIC | Age: 22
End: 2022-07-12

## 2022-07-12 RX ORDER — OMEPRAZOLE/SODIUM BICARBONATE 40; 1680 MG/1; MG/1
GIVE ONE PACKET VIA G-TUBE EVERY DAY 30 POWDER, FOR SUSPENSION ORAL
Qty: 30 PACKET | Refills: 4 | OUTPATIENT

## 2022-07-12 RX ORDER — OMEPRAZOLE, SODIUM BICARBONATE 40; 1680 MG/1; MG/1
GIVE ONE PACKET VIA G-TUBE EVERY DAY POWDER, FOR SUSPENSION ORAL
Qty: 30 PACK | Refills: 3 | OUTPATIENT

## 2022-07-13 ENCOUNTER — OFFICE VISIT (OUTPATIENT)
Dept: RURAL CLINIC 8 | Facility: CLINIC | Age: 22
End: 2022-07-13

## 2022-07-13 RX ORDER — DIAZEPAM 2 MG/1
TABLET ORAL
Qty: 0 | Refills: 0 | Status: ACTIVE | COMMUNITY
Start: 1900-01-01

## 2022-07-13 RX ORDER — TRIHEXYPHENIDYL HYDROCHLORIDE 2 MG/1
TABLET ORAL
Qty: 0 | Refills: 0 | Status: ACTIVE | COMMUNITY
Start: 1900-01-01

## 2022-07-13 RX ORDER — NYSTATIN CREAM 100000 [USP'U]/G
APPLY TO THE AFFECTED AREA AROUND GASTROSTOMY TWICE DAILY AS NEEDED 30 CREAM TOPICAL
Qty: 30 GRAM | Refills: 7 | Status: ACTIVE | COMMUNITY

## 2022-07-13 RX ORDER — LEUCOVORIN CALCIUM 10 MG/1
TABLET ORAL
Qty: 0 | Refills: 0 | Status: ACTIVE | COMMUNITY
Start: 1900-01-01

## 2022-07-13 RX ORDER — MONTELUKAST SODIUM 5 MG/1
CHEW 2 TABLETS (10 MG) BY ORAL ROUTE ONCE DAILY IN THE EVENING TABLET, CHEWABLE ORAL 1
Qty: 0 | Refills: 0 | Status: ACTIVE | COMMUNITY
Start: 1900-01-01

## 2022-07-13 RX ORDER — CARBIDOPA TABLETS 25 MG/1
TABLET ORAL
Qty: 0 | Refills: 0 | Status: ACTIVE | COMMUNITY
Start: 1900-01-01

## 2022-07-13 RX ORDER — FLUTICASONE PROPIONATE 50 UG/1
SPRAY, METERED NASAL
Qty: 0 | Refills: 0 | Status: ACTIVE | COMMUNITY
Start: 1900-01-01

## 2022-07-13 RX ORDER — CETIRIZINE HYDROCHLORIDE 5 MG/1
TABLET ORAL
Qty: 0 | Refills: 0 | Status: ACTIVE | COMMUNITY
Start: 1900-01-01

## 2022-07-13 RX ORDER — OMEPRAZOLE/SODIUM BICARBONATE 40; 1680 MG/1; MG/1
GIVE ONE PACKET VIA G-TUBE EVERY DAY 30 POWDER, FOR SUSPENSION ORAL
Qty: 30 PACKET | Refills: 4 | Status: ACTIVE | COMMUNITY

## 2022-07-13 RX ORDER — ECONAZOLE NITRATE 10 MG/G
APPLY TO THE AFFECTED AND SURROUNDING AREAS OF SKIN BY TOPICAL ROUTE 2 TIMES PER DAY IN THE MORNING AND EVENING CREAM TOPICAL 2
Qty: 1 | Refills: 2 | Status: ACTIVE | COMMUNITY
Start: 2019-11-14

## 2022-07-13 RX ORDER — BUDESONIDE 0.5 MG/2ML
SUSPENSION RESPIRATORY (INHALATION)
Qty: 0 | Refills: 0 | Status: ACTIVE | COMMUNITY
Start: 1900-01-01

## 2022-09-02 NOTE — PHYSICAL EXAM NECK/THYROID:
normal appearance , without tenderness upon palpation , no deformities , trachea midline , Thyroid normal size , no thyroid nodules , no masses , no JVD , thyroid nontender 0

## 2022-10-05 ENCOUNTER — TELEPHONE ENCOUNTER (OUTPATIENT)
Dept: URBAN - METROPOLITAN AREA CLINIC 92 | Facility: CLINIC | Age: 22
End: 2022-10-05

## 2022-10-05 RX ORDER — POLYETHYLENE GLYCOL 3350 17 G
1 PACKET MIXED WITH 8 OUNCES OF FLUID POWDER IN PACKET (EA) ORAL ONCE A DAY
Qty: 90 | Refills: 3 | OUTPATIENT

## 2022-10-14 ENCOUNTER — TELEPHONE ENCOUNTER (OUTPATIENT)
Dept: RURAL CLINIC 8 | Facility: CLINIC | Age: 22
End: 2022-10-14

## 2022-10-26 ENCOUNTER — OFFICE VISIT (OUTPATIENT)
Dept: RURAL CLINIC 8 | Facility: CLINIC | Age: 22
End: 2022-10-26
Payer: MEDICAID

## 2022-10-26 VITALS
HEART RATE: 75 BPM | SYSTOLIC BLOOD PRESSURE: 120 MMHG | DIASTOLIC BLOOD PRESSURE: 80 MMHG | BODY MASS INDEX: 26.68 KG/M2 | HEIGHT: 62 IN | WEIGHT: 145 LBS | TEMPERATURE: 97.3 F

## 2022-10-26 DIAGNOSIS — R13.12 OROPHARYNGEAL DYSPHAGIA: ICD-10-CM

## 2022-10-26 DIAGNOSIS — Z93.1 GASTROSTOMY IN PLACE: ICD-10-CM

## 2022-10-26 DIAGNOSIS — K21.9 GERD WITHOUT ESOPHAGITIS: ICD-10-CM

## 2022-10-26 DIAGNOSIS — G80.0 SPASTIC QUADRIPLEGIC CEREBRAL PALSY: ICD-10-CM

## 2022-10-26 PROBLEM — 302109006: Status: ACTIVE | Noted: 2022-10-26

## 2022-10-26 PROBLEM — 71457002: Status: ACTIVE | Noted: 2021-08-25

## 2022-10-26 PROBLEM — 48721008: Status: ACTIVE | Noted: 2022-02-09

## 2022-10-26 PROCEDURE — 43763 RPLC GTUBE REVJ GSTRST TRC: CPT | Performed by: INTERNAL MEDICINE

## 2022-10-26 PROCEDURE — 99213 OFFICE O/P EST LOW 20 MIN: CPT | Performed by: INTERNAL MEDICINE

## 2022-10-26 RX ORDER — FLUTICASONE PROPIONATE 50 UG/1
SPRAY, METERED NASAL
Qty: 0 | Refills: 0 | Status: ACTIVE | COMMUNITY
Start: 1900-01-01

## 2022-10-26 RX ORDER — OMEPRAZOLE, SODIUM BICARBONATE 40; 1680 MG/1; MG/1
GIVE ONE PACKET VIA G-TUBE EVERY DAY POWDER, FOR SUSPENSION ORAL
Qty: 30 PACK | Refills: 3 | Status: ACTIVE | COMMUNITY

## 2022-10-26 RX ORDER — DIAZEPAM 2 MG/1
TABLET ORAL
Qty: 0 | Refills: 0 | Status: ACTIVE | COMMUNITY
Start: 1900-01-01

## 2022-10-26 RX ORDER — POLYETHYLENE GLYCOL 3350 17 G
1 PACKET MIXED WITH 8 OUNCES OF FLUID POWDER IN PACKET (EA) ORAL ONCE A DAY
Qty: 90 | Refills: 3 | Status: ACTIVE | COMMUNITY

## 2022-10-26 RX ORDER — BUDESONIDE 0.5 MG/2ML
SUSPENSION RESPIRATORY (INHALATION)
Qty: 0 | Refills: 0 | Status: ACTIVE | COMMUNITY
Start: 1900-01-01

## 2022-10-26 RX ORDER — TRIHEXYPHENIDYL HYDROCHLORIDE 2 MG/1
TABLET ORAL
Qty: 0 | Refills: 0 | Status: ACTIVE | COMMUNITY
Start: 1900-01-01

## 2022-10-26 RX ORDER — MONTELUKAST SODIUM 5 MG/1
CHEW 2 TABLETS (10 MG) BY ORAL ROUTE ONCE DAILY IN THE EVENING TABLET, CHEWABLE ORAL 1
Qty: 0 | Refills: 0 | Status: ACTIVE | COMMUNITY
Start: 1900-01-01

## 2022-10-26 RX ORDER — NYSTATIN CREAM 100000 [USP'U]/G
APPLY TO THE AFFECTED AREA AROUND GASTROSTOMY TWICE DAILY AS NEEDED 30 CREAM TOPICAL
Qty: 30 GRAM | Refills: 7 | Status: ACTIVE | COMMUNITY

## 2022-10-26 RX ORDER — OMEPRAZOLE/SODIUM BICARBONATE 40; 1680 MG/1; MG/1
GIVE ONE PACKET VIA G-TUBE EVERY DAY 30 POWDER, FOR SUSPENSION ORAL
Qty: 30 PACKET | Refills: 4 | Status: ACTIVE | COMMUNITY

## 2022-10-26 RX ORDER — CARBIDOPA TABLETS 25 MG/1
TABLET ORAL
Qty: 0 | Refills: 0 | Status: ACTIVE | COMMUNITY
Start: 1900-01-01

## 2022-10-26 RX ORDER — CETIRIZINE HYDROCHLORIDE 5 MG/1
TABLET ORAL
Qty: 0 | Refills: 0 | Status: ACTIVE | COMMUNITY
Start: 1900-01-01

## 2022-10-26 RX ORDER — LEUCOVORIN CALCIUM 10 MG/1
TABLET ORAL
Qty: 0 | Refills: 0 | Status: ACTIVE | COMMUNITY
Start: 1900-01-01

## 2022-10-26 RX ORDER — ECONAZOLE NITRATE 10 MG/G
APPLY TO THE AFFECTED AND SURROUNDING AREAS OF SKIN BY TOPICAL ROUTE 2 TIMES PER DAY IN THE MORNING AND EVENING CREAM TOPICAL 2
Qty: 1 | Refills: 2 | Status: ACTIVE | COMMUNITY
Start: 2019-11-14

## 2022-10-26 NOTE — HPI-TODAY'S VISIT:
patient has significant cerebral palsy with dysphagia and is dependent on a button type gastrostomy for feedings.  He returns to the office today for follow-up in to exchange his gastrostomy.  He is accompanied by his mom and grandfather today.  GERD seems to be well controlled zegerid -  his mother tells me that recent blood work showed high iron and high sodium.  They have increased his free water flushes.  Continues to tolerate feedings well

## 2022-10-28 ENCOUNTER — OFFICE VISIT (OUTPATIENT)
Dept: RURAL CLINIC 8 | Facility: CLINIC | Age: 22
End: 2022-10-28

## 2022-11-04 ENCOUNTER — ERX REFILL RESPONSE (OUTPATIENT)
Dept: RURAL CLINIC 2 | Facility: CLINIC | Age: 22
End: 2022-11-04

## 2022-11-04 RX ORDER — OMEPRAZOLE/SODIUM BICARBONATE 40; 1680 MG/1; MG/1
GIVE ONE PACKET VIA G-TUBE EVERY DAY POWDER, FOR SUSPENSION ORAL
Qty: 30 PACK | Refills: 3 | OUTPATIENT

## 2022-11-04 RX ORDER — OMEPRAZOLE, SODIUM BICARBONATE 40; 1680 MG/1; MG/1
GIVE ONE PACKET VIA G-TUBE EVERY DAY POWDER, FOR SUSPENSION ORAL
Qty: 30 PACK | Refills: 3 | OUTPATIENT

## 2022-12-02 ENCOUNTER — ERX REFILL RESPONSE (OUTPATIENT)
Dept: RURAL CLINIC 2 | Facility: CLINIC | Age: 22
End: 2022-12-02

## 2022-12-02 RX ORDER — NYSTATIN CREAM 100000 [USP'U]/G
APPLY TO THE AFFECTED AREA AROUND GASTROSTOMY TWICE DAILY AS NEEDED CREAM TOPICAL
Qty: 30 GRAM | Refills: 6 | OUTPATIENT

## 2022-12-02 RX ORDER — NYSTATIN CREAM 100000 [USP'U]/G
APPLY TO THE AFFECTED AREA AROUND GASTROSTOMY TWICE DAILY AS NEEDED 30 CREAM TOPICAL
Qty: 30 GRAM | Refills: 7 | OUTPATIENT

## 2022-12-29 ENCOUNTER — ERX REFILL RESPONSE (OUTPATIENT)
Dept: RURAL CLINIC 2 | Facility: CLINIC | Age: 22
End: 2022-12-29

## 2022-12-29 RX ORDER — POLYETHYLENE GLYCOL 3350 17 G/17G
MIX ONE CAPFUL IN 8OZ OF IN FLUID PER G-TUBE EVERY DAY POWDER, FOR SOLUTION ORAL
Qty: 510 GRAM | Refills: 3 | OUTPATIENT

## 2022-12-29 RX ORDER — POLYETHYLENE GLYCOL 3350 17 G
1 PACKET MIXED WITH 8 OUNCES OF FLUID POWDER IN PACKET (EA) ORAL ONCE A DAY
Qty: 90 | Refills: 3 | OUTPATIENT

## 2023-02-08 ENCOUNTER — OFFICE VISIT (OUTPATIENT)
Dept: RURAL CLINIC 8 | Facility: CLINIC | Age: 23
End: 2023-02-08
Payer: MEDICAID

## 2023-02-08 VITALS
DIASTOLIC BLOOD PRESSURE: 80 MMHG | HEIGHT: 62 IN | SYSTOLIC BLOOD PRESSURE: 120 MMHG | TEMPERATURE: 98 F | BODY MASS INDEX: 26.31 KG/M2 | WEIGHT: 143 LBS | HEART RATE: 75 BPM

## 2023-02-08 DIAGNOSIS — K21.9 GERD WITHOUT ESOPHAGITIS: ICD-10-CM

## 2023-02-08 DIAGNOSIS — Z43.1 ATTENTION TO GASTROSTOMY TUBE: ICD-10-CM

## 2023-02-08 DIAGNOSIS — G40.909 SEIZURE DISORDER: ICD-10-CM

## 2023-02-08 DIAGNOSIS — R13.11 ORAL PHASE DYSPHAGIA: ICD-10-CM

## 2023-02-08 DIAGNOSIS — G80.8 OTHER CEREBRAL PALSY: ICD-10-CM

## 2023-02-08 PROBLEM — 128613002 SEIZURE DISORDER: Status: ACTIVE | Noted: 2021-01-13

## 2023-02-08 PROBLEM — 266435005: Status: ACTIVE | Noted: 2021-11-10

## 2023-02-08 PROBLEM — 305058001: Status: ACTIVE | Noted: 2022-02-09

## 2023-02-08 PROBLEM — 429975007: Status: ACTIVE | Noted: 2021-11-10

## 2023-02-08 PROBLEM — 128188000: Status: ACTIVE | Noted: 2021-08-25

## 2023-02-08 PROCEDURE — 43763 RPLC GTUBE REVJ GSTRST TRC: CPT | Performed by: INTERNAL MEDICINE

## 2023-02-08 PROCEDURE — 99212 OFFICE O/P EST SF 10 MIN: CPT | Performed by: INTERNAL MEDICINE

## 2023-02-08 RX ORDER — OMEPRAZOLE/SODIUM BICARBONATE 40; 1680 MG/1; MG/1
GIVE ONE PACKET VIA G-TUBE EVERY DAY POWDER, FOR SUSPENSION ORAL
Qty: 30 PACK | Refills: 3 | Status: ACTIVE | COMMUNITY

## 2023-02-08 RX ORDER — ECONAZOLE NITRATE 10 MG/G
APPLY TO THE AFFECTED AND SURROUNDING AREAS OF SKIN BY TOPICAL ROUTE 2 TIMES PER DAY IN THE MORNING AND EVENING CREAM TOPICAL 2
Qty: 1 | Refills: 2 | Status: ACTIVE | COMMUNITY
Start: 2019-11-14

## 2023-02-08 RX ORDER — MONTELUKAST SODIUM 5 MG/1
CHEW 2 TABLETS (10 MG) BY ORAL ROUTE ONCE DAILY IN THE EVENING TABLET, CHEWABLE ORAL 1
Qty: 0 | Refills: 0 | Status: ACTIVE | COMMUNITY
Start: 1900-01-01

## 2023-02-08 RX ORDER — BUDESONIDE 0.5 MG/2ML
SUSPENSION RESPIRATORY (INHALATION)
Qty: 0 | Refills: 0 | Status: ACTIVE | COMMUNITY
Start: 1900-01-01

## 2023-02-08 RX ORDER — CARBIDOPA TABLETS 25 MG/1
TABLET ORAL
Qty: 0 | Refills: 0 | Status: ACTIVE | COMMUNITY
Start: 1900-01-01

## 2023-02-08 RX ORDER — TRIHEXYPHENIDYL HYDROCHLORIDE 2 MG/1
TABLET ORAL
Qty: 0 | Refills: 0 | Status: ACTIVE | COMMUNITY
Start: 1900-01-01

## 2023-02-08 RX ORDER — DIAZEPAM 2 MG/1
TABLET ORAL
Qty: 0 | Refills: 0 | Status: ACTIVE | COMMUNITY
Start: 1900-01-01

## 2023-02-08 RX ORDER — CETIRIZINE HYDROCHLORIDE 5 MG/1
TABLET ORAL
Qty: 0 | Refills: 0 | Status: ACTIVE | COMMUNITY
Start: 1900-01-01

## 2023-02-08 RX ORDER — NYSTATIN CREAM 100000 [USP'U]/G
APPLY TO THE AFFECTED AREA AROUND GASTROSTOMY TWICE DAILY AS NEEDED CREAM TOPICAL
Qty: 30 GRAM | Refills: 6 | Status: ACTIVE | COMMUNITY

## 2023-02-08 RX ORDER — LEUCOVORIN CALCIUM 10 MG/1
TABLET ORAL
Qty: 0 | Refills: 0 | Status: ACTIVE | COMMUNITY
Start: 1900-01-01

## 2023-02-08 RX ORDER — POLYETHYLENE GLYCOL 3350 17 G/17G
MIX ONE CAPFUL IN 8OZ OF IN FLUID PER G-TUBE EVERY DAY POWDER, FOR SOLUTION ORAL
Qty: 510 GRAM | Refills: 3 | Status: ACTIVE | COMMUNITY

## 2023-02-08 RX ORDER — FLUTICASONE PROPIONATE 50 UG/1
SPRAY, METERED NASAL
Qty: 0 | Refills: 0 | Status: ACTIVE | COMMUNITY
Start: 1900-01-01

## 2023-02-08 NOTE — HPI-TODAY'S VISIT:
patient has significant cerebral palsy with dysphagia and is dependent on a button type gastrostomy for feedings.  He returns to the office today for follow-up in to exchange his gastrostomy.  He is accompanied by his mom and grandfather today.  GERD seems to be well controlled zegerid -  his mother tells me that recent blood work showed high iron and high sodium.  They have increased his free water flushes.  Continues to tolerate feedings well -  02/08/2023: Karson comes with his mother and grandfather today for gastrostomy tube exchange.  His company recently change regarding the supplying of his gastrostomy tube feeding equipment.

## 2023-02-08 NOTE — PHYSICAL EXAM HENT:
Head, normocephalic, atraumatic, Face, Face within normal limits, Ears, External ears within normal limits
DISPLAY PLAN FREE TEXT

## 2023-02-27 ENCOUNTER — ERX REFILL RESPONSE (OUTPATIENT)
Dept: RURAL CLINIC 2 | Facility: CLINIC | Age: 23
End: 2023-02-27

## 2023-02-27 RX ORDER — OMEPRAZOLE/SODIUM BICARBONATE 40; 1680 MG/1; MG/1
GIVE ONE PACKET VIA G-TUBE EVERY DAY POWDER, FOR SUSPENSION ORAL
Qty: 30 PACKET | Refills: 3 | OUTPATIENT

## 2023-02-27 RX ORDER — OMEPRAZOLE/SODIUM BICARBONATE 40; 1680 MG/1; MG/1
GIVE ONE PACKET VIA G-TUBE EVERY DAY POWDER, FOR SUSPENSION ORAL
Qty: 30 PACK | Refills: 3 | OUTPATIENT

## 2023-04-10 NOTE — PHYSICAL EXAM CHEST:
PT NEEDS TO HAVE REFILL SENT TO [facility].    METHYLPHENIDATE HCI - 20MG ORAL TABLET(RITALIN); TAKE 1 TABLET 3x DAILY       no lesions,  no deformities,  no traumatic injuries,  no significant scars are present,  chest wall non-tender,  no masses present, breathing is unlabored without accessory muscle use, normal breath sounds

## 2023-05-01 ENCOUNTER — ERX REFILL RESPONSE (OUTPATIENT)
Dept: RURAL CLINIC 2 | Facility: CLINIC | Age: 23
End: 2023-05-01

## 2023-05-01 RX ORDER — POLYETHYLENE GLYCOL 3350 17 G/17G
MIX ONE CAPFUL IN 8OZ OF IN FLUID PER G-TUBE EVERY DAY POWDER, FOR SOLUTION ORAL
Qty: 510 GRAM | Refills: 3 | OUTPATIENT

## 2023-05-10 ENCOUNTER — LAB OUTSIDE AN ENCOUNTER (OUTPATIENT)
Dept: RURAL CLINIC 8 | Facility: CLINIC | Age: 23
End: 2023-05-10

## 2023-05-10 ENCOUNTER — OFFICE VISIT (OUTPATIENT)
Dept: RURAL CLINIC 8 | Facility: CLINIC | Age: 23
End: 2023-05-10
Payer: MEDICAID

## 2023-05-10 VITALS
SYSTOLIC BLOOD PRESSURE: 120 MMHG | DIASTOLIC BLOOD PRESSURE: 80 MMHG | TEMPERATURE: 99.3 F | BODY MASS INDEX: 26.68 KG/M2 | WEIGHT: 145 LBS | HEIGHT: 62 IN | HEART RATE: 75 BPM

## 2023-05-10 DIAGNOSIS — K94.23 COMPLICATION OF FEEDING TUBE: ICD-10-CM

## 2023-05-10 PROCEDURE — 43763 RPLC GTUBE REVJ GSTRST TRC: CPT | Performed by: INTERNAL MEDICINE

## 2023-05-10 PROCEDURE — 43762 RPLC GTUBE NO REVJ TRC: CPT | Performed by: INTERNAL MEDICINE

## 2023-05-10 PROCEDURE — 99212 OFFICE O/P EST SF 10 MIN: CPT | Performed by: INTERNAL MEDICINE

## 2023-05-10 RX ORDER — TRIHEXYPHENIDYL HYDROCHLORIDE 2 MG/1
TABLET ORAL
Qty: 0 | Refills: 0 | COMMUNITY
Start: 1900-01-01

## 2023-05-10 RX ORDER — MONTELUKAST SODIUM 5 MG/1
CHEW 2 TABLETS (10 MG) BY ORAL ROUTE ONCE DAILY IN THE EVENING TABLET, CHEWABLE ORAL 1
Qty: 0 | Refills: 0 | COMMUNITY
Start: 1900-01-01

## 2023-05-10 RX ORDER — BUDESONIDE 0.5 MG/2ML
SUSPENSION RESPIRATORY (INHALATION)
Qty: 0 | Refills: 0 | COMMUNITY
Start: 1900-01-01

## 2023-05-10 RX ORDER — LEUCOVORIN CALCIUM 10 MG/1
TABLET ORAL
Qty: 0 | Refills: 0 | COMMUNITY
Start: 1900-01-01

## 2023-05-10 RX ORDER — CETIRIZINE HYDROCHLORIDE 5 MG/1
TABLET ORAL
Qty: 0 | Refills: 0 | COMMUNITY
Start: 1900-01-01

## 2023-05-10 RX ORDER — NYSTATIN CREAM 100000 [USP'U]/G
APPLY TO THE AFFECTED AREA AROUND GASTROSTOMY TWICE DAILY AS NEEDED CREAM TOPICAL
Qty: 30 GRAM | Refills: 6 | COMMUNITY

## 2023-05-10 RX ORDER — FLUTICASONE PROPIONATE 50 UG/1
SPRAY, METERED NASAL
Qty: 0 | Refills: 0 | COMMUNITY
Start: 1900-01-01

## 2023-05-10 RX ORDER — OMEPRAZOLE/SODIUM BICARBONATE 40; 1680 MG/1; MG/1
GIVE ONE PACKET VIA G-TUBE EVERY DAY POWDER, FOR SUSPENSION ORAL
Qty: 30 PACKET | Refills: 3 | COMMUNITY

## 2023-05-10 RX ORDER — DIAZEPAM 2 MG/1
TABLET ORAL
Qty: 0 | Refills: 0 | COMMUNITY
Start: 1900-01-01

## 2023-05-10 RX ORDER — POLYETHYLENE GLYCOL 3350 17 G/17G
MIX ONE CAPFUL IN 8OZ OF IN FLUID PER G-TUBE EVERY DAY POWDER, FOR SOLUTION ORAL
Qty: 510 GRAM | Refills: 3 | COMMUNITY

## 2023-05-10 RX ORDER — CARBIDOPA TABLETS 25 MG/1
TABLET ORAL
Qty: 0 | Refills: 0 | COMMUNITY
Start: 1900-01-01

## 2023-05-10 RX ORDER — ECONAZOLE NITRATE 10 MG/G
APPLY TO THE AFFECTED AND SURROUNDING AREAS OF SKIN BY TOPICAL ROUTE 2 TIMES PER DAY IN THE MORNING AND EVENING CREAM TOPICAL 2
Qty: 1 | Refills: 2 | COMMUNITY
Start: 2019-11-14

## 2023-05-10 NOTE — HPI-TODAY'S VISIT:
patient has significant cerebral palsy with dysphagia and is dependent on a button type gastrostomy for feedings.  He returns to the office today for follow-up in to exchange his gastrostomy.  He is accompanied by his mom and grandfather today.  GERD seems to be well controlled zegerid -  his mother tells me that recent blood work showed high iron and high sodium.  They have increased his free water flushes.  Continues to tolerate feedings well -  02/08/2023: Karson comes with his mother and grandfather today for gastrostomy tube exchange.  His company recently change regarding the supplying of his gastrostomy tube feeding equipment. -  05/10/2023:  Karson comes to the office today with his mom and grandfather for gastrostomy Tube exchange. he has been recently seen by Dr. Abarca and some lab work has been drawn showing some elevation of hemoglobin.  He is scheduled to see Hematology about that

## 2023-06-28 ENCOUNTER — ERX REFILL RESPONSE (OUTPATIENT)
Dept: RURAL CLINIC 2 | Facility: CLINIC | Age: 23
End: 2023-06-28

## 2023-06-28 RX ORDER — NYSTATIN CREAM 100000 [USP'U]/G
APPLY TO THE AFFECTED AREA AROUND GASTROSTOMY TWICE DAILY AS NEEDED CREAM TOPICAL
Qty: 30 GRAM | Refills: 6 | OUTPATIENT

## 2023-06-28 RX ORDER — OMEPRAZOLE/SODIUM BICARBONATE 40; 1680 MG/1; MG/1
GIVE ONE PACKET VIA G-TUBE EVERY DAY POWDER, FOR SUSPENSION ORAL
Qty: 30 PACKET | Refills: 3 | OUTPATIENT

## 2023-08-11 ENCOUNTER — OFFICE VISIT (OUTPATIENT)
Dept: RURAL CLINIC 8 | Facility: CLINIC | Age: 23
End: 2023-08-11
Payer: MEDICAID

## 2023-08-11 ENCOUNTER — LAB OUTSIDE AN ENCOUNTER (OUTPATIENT)
Dept: RURAL CLINIC 8 | Facility: CLINIC | Age: 23
End: 2023-08-11

## 2023-08-11 VITALS
WEIGHT: 148 LBS | HEIGHT: 62 IN | BODY MASS INDEX: 27.23 KG/M2 | DIASTOLIC BLOOD PRESSURE: 57 MMHG | HEART RATE: 99 BPM | SYSTOLIC BLOOD PRESSURE: 99 MMHG | TEMPERATURE: 98.6 F

## 2023-08-11 DIAGNOSIS — G80.0 SPASTIC QUADRIPLEGIC CEREBRAL PALSY: ICD-10-CM

## 2023-08-11 DIAGNOSIS — R13.12 OROPHARYNGEAL DYSPHAGIA: ICD-10-CM

## 2023-08-11 DIAGNOSIS — K21.9 GERD WITHOUT ESOPHAGITIS: ICD-10-CM

## 2023-08-11 DIAGNOSIS — Z43.1 ATTENTION TO GASTROSTOMY: ICD-10-CM

## 2023-08-11 PROBLEM — 305058001: Status: ACTIVE | Noted: 2023-08-11

## 2023-08-11 PROCEDURE — 43763 RPLC GTUBE REVJ GSTRST TRC: CPT | Performed by: INTERNAL MEDICINE

## 2023-08-11 PROCEDURE — 43762 RPLC GTUBE NO REVJ TRC: CPT | Performed by: INTERNAL MEDICINE

## 2023-08-11 PROCEDURE — 99213 OFFICE O/P EST LOW 20 MIN: CPT | Performed by: INTERNAL MEDICINE

## 2023-08-11 RX ORDER — ECONAZOLE NITRATE 10 MG/G
APPLY TO THE AFFECTED AND SURROUNDING AREAS OF SKIN BY TOPICAL ROUTE 2 TIMES PER DAY IN THE MORNING AND EVENING CREAM TOPICAL 2
Qty: 1 | Refills: 2 | Status: ACTIVE | COMMUNITY
Start: 2019-11-14

## 2023-08-11 RX ORDER — DIAZEPAM 2 MG/1
TABLET ORAL
Qty: 0 | Refills: 0 | Status: ACTIVE | COMMUNITY
Start: 1900-01-01

## 2023-08-11 RX ORDER — FLUTICASONE PROPIONATE 50 UG/1
SPRAY, METERED NASAL
Qty: 0 | Refills: 0 | Status: ACTIVE | COMMUNITY
Start: 1900-01-01

## 2023-08-11 RX ORDER — CARBIDOPA TABLETS 25 MG/1
TABLET ORAL
Qty: 0 | Refills: 0 | Status: ACTIVE | COMMUNITY
Start: 1900-01-01

## 2023-08-11 RX ORDER — NYSTATIN CREAM 100000 [USP'U]/G
APPLY TO THE AFFECTED AREA AROUND GASTROSTOMY TWICE DAILY AS NEEDED CREAM TOPICAL
Qty: 30 GRAM | Refills: 6 | Status: ACTIVE | COMMUNITY

## 2023-08-11 RX ORDER — MONTELUKAST SODIUM 5 MG/1
CHEW 2 TABLETS (10 MG) BY ORAL ROUTE ONCE DAILY IN THE EVENING TABLET, CHEWABLE ORAL 1
Qty: 0 | Refills: 0 | Status: ACTIVE | COMMUNITY
Start: 1900-01-01

## 2023-08-11 RX ORDER — TRIHEXYPHENIDYL HYDROCHLORIDE 2 MG/1
TABLET ORAL
Qty: 0 | Refills: 0 | Status: ACTIVE | COMMUNITY
Start: 1900-01-01

## 2023-08-11 RX ORDER — OMEPRAZOLE/SODIUM BICARBONATE 40; 1680 MG/1; MG/1
GIVE ONE PACKET VIA G-TUBE EVERY DAY POWDER, FOR SUSPENSION ORAL
Qty: 30 PACKET | Refills: 3 | Status: ACTIVE | COMMUNITY

## 2023-08-11 RX ORDER — POLYETHYLENE GLYCOL 3350 17 G/17G
MIX ONE CAPFUL IN 8OZ OF IN FLUID PER G-TUBE EVERY DAY POWDER, FOR SOLUTION ORAL
Qty: 510 GRAM | Refills: 3 | Status: ACTIVE | COMMUNITY

## 2023-08-11 RX ORDER — BUDESONIDE 0.5 MG/2ML
SUSPENSION RESPIRATORY (INHALATION)
Qty: 0 | Refills: 0 | Status: ACTIVE | COMMUNITY
Start: 1900-01-01

## 2023-08-11 RX ORDER — CETIRIZINE HYDROCHLORIDE 5 MG/1
TABLET ORAL
Qty: 0 | Refills: 0 | Status: ACTIVE | COMMUNITY
Start: 1900-01-01

## 2023-08-11 RX ORDER — LEUCOVORIN CALCIUM 10 MG/1
TABLET ORAL
Qty: 0 | Refills: 0 | Status: ACTIVE | COMMUNITY
Start: 1900-01-01

## 2023-08-11 NOTE — PHYSICAL EXAM GASTROINTESTINAL
Abdomen , soft, nontender, nondistended , no guarding or rigidity , no masses palpable , normal bowel sounds , Liver and Spleen,  no hepatosplenomegaly , liver nontender -  button type gastrostomy left upper quadrant

## 2023-08-11 NOTE — HPI-TODAY'S VISIT:
patient has significant cerebral palsy with dysphagia and is dependent on a button type gastrostomy for feedings.  He returns to the office today for follow-up in to exchange his gastrostomy.  He is accompanied by his mom and grandfather today.  GERD seems to be well controlled zegerid -  his mother tells me that recent blood work showed high iron and high sodium.  They have increased his free water flushes.  Continues to tolerate feedings well -  02/08/2023: Karson comes with his mother and grandfather today for gastrostomy tube exchange.  His company recently change regarding the supplying of his gastrostomy tube feeding equipment. -  05/10/2023:  Karson comes to the office today with his mom and grandfather for gastrostomy Tube exchange. he has been recently seen by Dr. Abarca and some lab work has been drawn showing some elevation of hemoglobin.  He is scheduled to see Hematology about that -  08/11/2023: The patient comes to the office with his mom and grandfather for gastrostomy tube exchange.  He is doing very well from a GI perspective.  He is tolerating tube feedings well.  He is not having any evidence of refractory acid reflux disease.  Bowel movements are normal.

## 2023-08-15 ENCOUNTER — TELEPHONE ENCOUNTER (OUTPATIENT)
Dept: URBAN - METROPOLITAN AREA CLINIC 63 | Facility: CLINIC | Age: 23
End: 2023-08-15

## 2023-09-06 ENCOUNTER — TELEPHONE ENCOUNTER (OUTPATIENT)
Dept: RURAL CLINIC 2 | Facility: CLINIC | Age: 23
End: 2023-09-06

## 2023-10-02 ENCOUNTER — TELEPHONE ENCOUNTER (OUTPATIENT)
Dept: RURAL CLINIC 2 | Facility: CLINIC | Age: 23
End: 2023-10-02

## 2023-11-08 ENCOUNTER — OFFICE VISIT (OUTPATIENT)
Dept: RURAL CLINIC 8 | Facility: CLINIC | Age: 23
End: 2023-11-08
Payer: MEDICAID

## 2023-11-08 ENCOUNTER — LAB OUTSIDE AN ENCOUNTER (OUTPATIENT)
Dept: RURAL CLINIC 8 | Facility: CLINIC | Age: 23
End: 2023-11-08

## 2023-11-08 DIAGNOSIS — R13.12 OROPHARYNGEAL DYSPHAGIA: ICD-10-CM

## 2023-11-08 DIAGNOSIS — Z43.1 ATTENTION TO GASTROSTOMY TUBE: ICD-10-CM

## 2023-11-08 DIAGNOSIS — G80.0 SPASTIC QUADRIPLEGIC CEREBRAL PALSY: ICD-10-CM

## 2023-11-08 DIAGNOSIS — K21.9 CHRONIC GERD: ICD-10-CM

## 2023-11-08 PROBLEM — 235595009: Status: ACTIVE | Noted: 2023-11-08

## 2023-11-08 PROCEDURE — 99212 OFFICE O/P EST SF 10 MIN: CPT | Performed by: INTERNAL MEDICINE

## 2023-11-08 PROCEDURE — 43763 RPLC GTUBE REVJ GSTRST TRC: CPT | Performed by: INTERNAL MEDICINE

## 2023-11-08 PROCEDURE — 43762 RPLC GTUBE NO REVJ TRC: CPT | Performed by: INTERNAL MEDICINE

## 2023-11-08 RX ORDER — FLUTICASONE PROPIONATE 50 UG/1
SPRAY, METERED NASAL
Qty: 0 | Refills: 0 | Status: ACTIVE | COMMUNITY
Start: 1900-01-01

## 2023-11-08 RX ORDER — CARBIDOPA TABLETS 25 MG/1
TABLET ORAL
Qty: 0 | Refills: 0 | Status: ACTIVE | COMMUNITY
Start: 1900-01-01

## 2023-11-08 RX ORDER — MONTELUKAST SODIUM 5 MG/1
CHEW 2 TABLETS (10 MG) BY ORAL ROUTE ONCE DAILY IN THE EVENING TABLET, CHEWABLE ORAL 1
Qty: 0 | Refills: 0 | Status: ACTIVE | COMMUNITY
Start: 1900-01-01

## 2023-11-08 RX ORDER — DIAZEPAM 2 MG/1
TABLET ORAL
Qty: 0 | Refills: 0 | Status: ACTIVE | COMMUNITY
Start: 1900-01-01

## 2023-11-08 RX ORDER — TRIHEXYPHENIDYL HYDROCHLORIDE 2 MG/1
TABLET ORAL
Qty: 0 | Refills: 0 | Status: ACTIVE | COMMUNITY
Start: 1900-01-01

## 2023-11-08 RX ORDER — LEUCOVORIN CALCIUM 10 MG/1
TABLET ORAL
Qty: 0 | Refills: 0 | Status: ACTIVE | COMMUNITY
Start: 1900-01-01

## 2023-11-08 RX ORDER — NYSTATIN CREAM 100000 [USP'U]/G
APPLY TO THE AFFECTED AREA AROUND GASTROSTOMY TWICE DAILY AS NEEDED CREAM TOPICAL
Qty: 30 GRAM | Refills: 6 | Status: ACTIVE | COMMUNITY

## 2023-11-08 RX ORDER — POLYETHYLENE GLYCOL 3350 17 G/17G
MIX ONE CAPFUL IN 8OZ OF IN FLUID PER G-TUBE EVERY DAY POWDER, FOR SOLUTION ORAL
Qty: 510 GRAM | Refills: 3 | Status: ACTIVE | COMMUNITY

## 2023-11-08 RX ORDER — OMEPRAZOLE/SODIUM BICARBONATE 40; 1680 MG/1; MG/1
GIVE ONE PACKET VIA G-TUBE EVERY DAY POWDER, FOR SUSPENSION ORAL
Qty: 30 PACKET | Refills: 3 | Status: ACTIVE | COMMUNITY

## 2023-11-08 RX ORDER — BUDESONIDE 0.5 MG/2ML
SUSPENSION RESPIRATORY (INHALATION)
Qty: 0 | Refills: 0 | Status: ACTIVE | COMMUNITY
Start: 1900-01-01

## 2023-11-08 RX ORDER — ECONAZOLE NITRATE 10 MG/G
APPLY TO THE AFFECTED AND SURROUNDING AREAS OF SKIN BY TOPICAL ROUTE 2 TIMES PER DAY IN THE MORNING AND EVENING CREAM TOPICAL 2
Qty: 1 | Refills: 2 | Status: ACTIVE | COMMUNITY
Start: 2019-11-14

## 2023-11-08 RX ORDER — CETIRIZINE HYDROCHLORIDE 5 MG/1
TABLET ORAL
Qty: 0 | Refills: 0 | Status: ACTIVE | COMMUNITY
Start: 1900-01-01

## 2023-11-08 NOTE — PHYSICAL EXAM GASTROINTESTINAL
Abdomen , soft, nontender, nondistended , no guarding or rigidity , no masses palpable , normal bowel sounds , Liver and Spleen,  no hepatosplenomegaly , liver nontender -  button PEG in the epigastrium well-appearing.

## 2023-11-08 NOTE — HPI-TODAY'S VISIT:
patient has significant cerebral palsy with dysphagia and is dependent on a button type gastrostomy for feedings.  He returns to the office today for follow-up in to exchange his gastrostomy.  He is accompanied by his mom and grandfather today.  GERD seems to be well controlled zegerid -  his mother tells me that recent blood work showed high iron and high sodium.  They have increased his free water flushes.  Continues to tolerate feedings well -  02/08/2023: Karson comes with his mother and grandfather today for gastrostomy tube exchange.  His company recently change regarding the supplying of his gastrostomy tube feeding equipment. -  05/10/2023:  Karson comes to the office today with his mom and grandfather for gastrostomy Tube exchange. he has been recently seen by Dr. Abarca and some lab work has been drawn showing some elevation of hemoglobin.  He is scheduled to see Hematology about that -  08/11/2023: The patient comes to the office with his mom and grandfather for gastrostomy tube exchange.  He is doing very well from a GI perspective.  He is tolerating tube feedings well.  He is not having any evidence of refractory acid reflux disease.  Bowel movements are normal. - 11/8/2023.  Patient comes for gastrostomy tube change.  He continues to do well.  Weight is stable.  Acid reflux seems to be controlled on current antisecretory therapy.

## 2024-01-12 NOTE — PHYSICAL EXAM CHEST:
Patient may resume diet on arrival to unit post stress test.   no lesions,  no deformities,  no traumatic injuries,  no significant scars are present,  chest wall non-tender,  no masses present, breathing is unlabored without accessory muscle use, normal breath sounds

## 2024-02-09 ENCOUNTER — OV EP (OUTPATIENT)
Dept: RURAL CLINIC 8 | Facility: CLINIC | Age: 24
End: 2024-02-09
Payer: MEDICAID

## 2024-02-09 VITALS — BODY MASS INDEX: 27.23 KG/M2 | HEIGHT: 62 IN | WEIGHT: 148 LBS

## 2024-02-09 DIAGNOSIS — R13.12 OROPHARYNGEAL DYSPHAGIA: ICD-10-CM

## 2024-02-09 DIAGNOSIS — K21.9 CHRONIC GERD: ICD-10-CM

## 2024-02-09 DIAGNOSIS — Z43.1 ATTENTION TO GASTROSTOMY: ICD-10-CM

## 2024-02-09 DIAGNOSIS — G80.9 CONGENITAL CEREBRAL PALSY: ICD-10-CM

## 2024-02-09 PROBLEM — 35400008: Status: ACTIVE | Noted: 2024-02-09

## 2024-02-09 PROCEDURE — 43763 RPLC GTUBE REVJ GSTRST TRC: CPT | Performed by: INTERNAL MEDICINE

## 2024-02-09 PROCEDURE — 43762 RPLC GTUBE NO REVJ TRC: CPT | Performed by: INTERNAL MEDICINE

## 2024-02-09 PROCEDURE — 99212 OFFICE O/P EST SF 10 MIN: CPT | Performed by: INTERNAL MEDICINE

## 2024-02-09 RX ORDER — LEUCOVORIN CALCIUM 10 MG/1
TABLET ORAL
Qty: 0 | Refills: 0 | Status: ACTIVE | COMMUNITY
Start: 1900-01-01

## 2024-02-09 RX ORDER — ECONAZOLE NITRATE 10 MG/G
APPLY TO THE AFFECTED AND SURROUNDING AREAS OF SKIN BY TOPICAL ROUTE 2 TIMES PER DAY IN THE MORNING AND EVENING CREAM TOPICAL 2
Qty: 1 | Refills: 2 | Status: ACTIVE | COMMUNITY
Start: 2019-11-14

## 2024-02-09 RX ORDER — FLUTICASONE PROPIONATE 50 UG/1
SPRAY, METERED NASAL
Qty: 0 | Refills: 0 | Status: ACTIVE | COMMUNITY
Start: 1900-01-01

## 2024-02-09 RX ORDER — MONTELUKAST SODIUM 5 MG/1
CHEW 2 TABLETS (10 MG) BY ORAL ROUTE ONCE DAILY IN THE EVENING TABLET, CHEWABLE ORAL 1
Qty: 0 | Refills: 0 | Status: ACTIVE | COMMUNITY
Start: 1900-01-01

## 2024-02-09 RX ORDER — TRIHEXYPHENIDYL HYDROCHLORIDE 2 MG/1
TABLET ORAL
Qty: 0 | Refills: 0 | Status: ACTIVE | COMMUNITY
Start: 1900-01-01

## 2024-02-09 RX ORDER — POLYETHYLENE GLYCOL 3350 17 G/17G
MIX ONE CAPFUL IN 8OZ OF IN FLUID PER G-TUBE EVERY DAY POWDER, FOR SOLUTION ORAL
Qty: 510 GRAM | Refills: 3 | Status: ACTIVE | COMMUNITY

## 2024-02-09 RX ORDER — CETIRIZINE HYDROCHLORIDE 5 MG/1
TABLET ORAL
Qty: 0 | Refills: 0 | Status: ACTIVE | COMMUNITY
Start: 1900-01-01

## 2024-02-09 RX ORDER — DIAZEPAM 2 MG/1
TABLET ORAL
Qty: 0 | Refills: 0 | Status: ACTIVE | COMMUNITY
Start: 1900-01-01

## 2024-02-09 RX ORDER — CARBIDOPA TABLETS 25 MG/1
TABLET ORAL
Qty: 0 | Refills: 0 | Status: ACTIVE | COMMUNITY
Start: 1900-01-01

## 2024-02-09 RX ORDER — BUDESONIDE 0.5 MG/2ML
SUSPENSION RESPIRATORY (INHALATION)
Qty: 0 | Refills: 0 | Status: ACTIVE | COMMUNITY
Start: 1900-01-01

## 2024-02-09 RX ORDER — OMEPRAZOLE/SODIUM BICARBONATE 40; 1680 MG/1; MG/1
GIVE ONE PACKET VIA G-TUBE EVERY DAY POWDER, FOR SUSPENSION ORAL
Qty: 30 PACKET | Refills: 3 | Status: ACTIVE | COMMUNITY

## 2024-02-09 RX ORDER — NYSTATIN CREAM 100000 [USP'U]/G
APPLY TO THE AFFECTED AREA AROUND GASTROSTOMY TWICE DAILY AS NEEDED CREAM TOPICAL
Qty: 30 GRAM | Refills: 6 | Status: ACTIVE | COMMUNITY

## 2024-02-09 NOTE — HPI-TODAY'S VISIT:
patient has significant cerebral palsy with dysphagia and is dependent on a button type gastrostomy for feedings.  He returns to the office today for follow-up in to exchange his gastrostomy.  He is accompanied by his mom and grandfather today.  GERD seems to be well controlled zegerid -  his mother tells me that recent blood work showed high iron and high sodium.  They have increased his free water flushes.  Continues to tolerate feedings well -  02/08/2023: Karson comes with his mother and grandfather today for gastrostomy tube exchange.  His company recently change regarding the supplying of his gastrostomy tube feeding equipment. -  05/10/2023:  Karson comes to the office today with his mom and grandfather for gastrostomy Tube exchange. he has been recently seen by Dr. Abarca and some lab work has been drawn showing some elevation of hemoglobin.  He is scheduled to see Hematology about that -  08/11/2023: The patient comes to the office with his mom and grandfather for gastrostomy tube exchange.  He is doing very well from a GI perspective.  He is tolerating tube feedings well.  He is not having any evidence of refractory acid reflux disease.  Bowel movements are normal. - 11/8/2023.  Patient comes for gastrostomy tube change.  He continues to do well.  Weight is stable.  Acid reflux seems to be controlled on current antisecretory therapy. -  02/09/2024: Patient comes to the office with his mother and grandfather for regularly scheduled gastrostomy tube exchange.  He is doing actually quite well.   his mother reports that he has recently seen Dr. Kate Tillman and was diagnosed with hemochromatosis for which he started undergoing phlebotomy.  Also, patient has some concerns for sleep apnea and they are working to have a sleep study done.

## 2024-05-08 ENCOUNTER — DASHBOARD ENCOUNTERS (OUTPATIENT)
Age: 24
End: 2024-05-08

## 2024-05-10 ENCOUNTER — OFFICE VISIT (OUTPATIENT)
Dept: RURAL CLINIC 8 | Facility: CLINIC | Age: 24
End: 2024-05-10
Payer: MEDICAID

## 2024-05-10 DIAGNOSIS — K94.23 COMPLICATION OF FEEDING TUBE: ICD-10-CM

## 2024-05-10 PROCEDURE — 99213 OFFICE O/P EST LOW 20 MIN: CPT | Performed by: INTERNAL MEDICINE

## 2024-05-10 PROCEDURE — 43762 RPLC GTUBE NO REVJ TRC: CPT | Performed by: INTERNAL MEDICINE

## 2024-05-10 PROCEDURE — 43763 RPLC GTUBE REVJ GSTRST TRC: CPT | Performed by: INTERNAL MEDICINE

## 2024-05-10 RX ORDER — MONTELUKAST SODIUM 5 MG/1
CHEW 2 TABLETS (10 MG) BY ORAL ROUTE ONCE DAILY IN THE EVENING TABLET, CHEWABLE ORAL 1
Qty: 0 | Refills: 0 | Status: ACTIVE | COMMUNITY
Start: 1900-01-01

## 2024-05-10 RX ORDER — FLUTICASONE PROPIONATE 50 UG/1
SPRAY, METERED NASAL
Qty: 0 | Refills: 0 | Status: ACTIVE | COMMUNITY
Start: 1900-01-01

## 2024-05-10 RX ORDER — POLYETHYLENE GLYCOL 3350 17 G/17G
MIX ONE CAPFUL IN 8OZ OF IN FLUID PER G-TUBE EVERY DAY POWDER, FOR SOLUTION ORAL
Qty: 510 GRAM | Refills: 3 | Status: ACTIVE | COMMUNITY

## 2024-05-10 RX ORDER — BUDESONIDE 0.5 MG/2ML
SUSPENSION RESPIRATORY (INHALATION)
Qty: 0 | Refills: 0 | Status: ACTIVE | COMMUNITY
Start: 1900-01-01

## 2024-05-10 RX ORDER — ECONAZOLE NITRATE 10 MG/G
APPLY TO THE AFFECTED AND SURROUNDING AREAS OF SKIN BY TOPICAL ROUTE 2 TIMES PER DAY IN THE MORNING AND EVENING CREAM TOPICAL 2
Qty: 1 | Refills: 2 | Status: ACTIVE | COMMUNITY
Start: 2019-11-14

## 2024-05-10 RX ORDER — OMEPRAZOLE/SODIUM BICARBONATE 40; 1680 MG/1; MG/1
GIVE ONE PACKET VIA G-TUBE EVERY DAY POWDER, FOR SUSPENSION ORAL
Qty: 30 PACKET | Refills: 3

## 2024-05-10 RX ORDER — POLYETHYLENE GLYCOL 3350 17 G/17G
MIX ONE CAPFUL POWDER, FOR SOLUTION ORAL ONCE DAILY
Qty: 510 | Refills: 3

## 2024-05-10 RX ORDER — LEUCOVORIN CALCIUM 10 MG/1
TABLET ORAL
Qty: 0 | Refills: 0 | Status: ACTIVE | COMMUNITY
Start: 1900-01-01

## 2024-05-10 RX ORDER — TRIHEXYPHENIDYL HYDROCHLORIDE 2 MG/1
TABLET ORAL
Qty: 0 | Refills: 0 | Status: ACTIVE | COMMUNITY
Start: 1900-01-01

## 2024-05-10 RX ORDER — CARBIDOPA TABLETS 25 MG/1
TABLET ORAL
Qty: 0 | Refills: 0 | Status: ACTIVE | COMMUNITY
Start: 1900-01-01

## 2024-05-10 RX ORDER — DIAZEPAM 2 MG/1
TABLET ORAL
Qty: 0 | Refills: 0 | Status: ACTIVE | COMMUNITY
Start: 1900-01-01

## 2024-05-10 RX ORDER — CETIRIZINE HYDROCHLORIDE 5 MG/1
TABLET ORAL
Qty: 0 | Refills: 0 | Status: ACTIVE | COMMUNITY
Start: 1900-01-01

## 2024-05-10 RX ORDER — NYSTATIN CREAM 100000 [USP'U]/G
APPLY TO THE AFFECTED AREA AROUND GASTROSTOMY TWICE DAILY AS NEEDED CREAM TOPICAL
Qty: 30 GRAM | Refills: 6 | Status: ACTIVE | COMMUNITY

## 2024-05-10 RX ORDER — OMEPRAZOLE/SODIUM BICARBONATE 40; 1680 MG/1; MG/1
GIVE ONE PACKET VIA G-TUBE EVERY DAY POWDER, FOR SUSPENSION ORAL
Qty: 30 PACKET | Refills: 3 | Status: ACTIVE | COMMUNITY

## 2024-05-13 ENCOUNTER — TELEPHONE ENCOUNTER (OUTPATIENT)
Dept: RURAL CLINIC 2 | Facility: CLINIC | Age: 24
End: 2024-05-13

## 2024-09-13 ENCOUNTER — OFFICE VISIT (OUTPATIENT)
Dept: RURAL CLINIC 8 | Facility: CLINIC | Age: 24
End: 2024-09-13
Payer: MEDICAID

## 2024-09-13 VITALS
HEIGHT: 62 IN | TEMPERATURE: 98.8 F | SYSTOLIC BLOOD PRESSURE: 110 MMHG | WEIGHT: 141 LBS | HEART RATE: 102 BPM | DIASTOLIC BLOOD PRESSURE: 84 MMHG | BODY MASS INDEX: 25.95 KG/M2

## 2024-09-13 DIAGNOSIS — K21.9 CHRONIC GERD: ICD-10-CM

## 2024-09-13 DIAGNOSIS — Z43.1 ATTENTION TO GASTROSTOMY: ICD-10-CM

## 2024-09-13 DIAGNOSIS — R13.12 OROPHARYNGEAL DYSPHAGIA: ICD-10-CM

## 2024-09-13 PROCEDURE — 43762 RPLC GTUBE NO REVJ TRC: CPT | Performed by: INTERNAL MEDICINE

## 2024-09-13 PROCEDURE — 43763 RPLC GTUBE REVJ GSTRST TRC: CPT | Performed by: INTERNAL MEDICINE

## 2024-09-13 PROCEDURE — 99213 OFFICE O/P EST LOW 20 MIN: CPT | Performed by: INTERNAL MEDICINE

## 2024-09-13 RX ORDER — POLYETHYLENE GLYCOL 3350 17 G/17G
MIX ONE CAPFUL POWDER, FOR SOLUTION ORAL ONCE DAILY
Qty: 510 | Refills: 3
End: 2025-09-08

## 2024-09-13 RX ORDER — LEUCOVORIN CALCIUM 10 MG/1
TABLET ORAL
Qty: 0 | Refills: 0 | Status: ACTIVE | COMMUNITY
Start: 1900-01-01

## 2024-09-13 RX ORDER — OMEPRAZOLE/SODIUM BICARBONATE 40; 1680 MG/1; MG/1
GIVE ONE PACKET VIA G-TUBE EVERY DAY POWDER, FOR SUSPENSION ORAL
Qty: 30 PACKET | Refills: 3 | Status: ACTIVE | COMMUNITY

## 2024-09-13 RX ORDER — CARBIDOPA TABLETS 25 MG/1
TABLET ORAL
Qty: 0 | Refills: 0 | Status: ACTIVE | COMMUNITY
Start: 1900-01-01

## 2024-09-13 RX ORDER — NYSTATIN CREAM 100000 [USP'U]/G
APPLY TO THE AFFECTED AREA AROUND GASTROSTOMY TWICE DAILY AS NEEDED CREAM TOPICAL
Qty: 30 GRAM | Refills: 6 | Status: ACTIVE | COMMUNITY

## 2024-09-13 RX ORDER — ECONAZOLE NITRATE 10 MG/G
APPLY TO THE AFFECTED AND SURROUNDING AREAS OF SKIN BY TOPICAL ROUTE 2 TIMES PER DAY IN THE MORNING AND EVENING CREAM TOPICAL 2
Qty: 1 | Refills: 2 | Status: ACTIVE | COMMUNITY
Start: 2019-11-14

## 2024-09-13 RX ORDER — OMEPRAZOLE/SODIUM BICARBONATE 40; 1680 MG/1; MG/1
GIVE ONE PACKET VIA G-TUBE EVERY DAY POWDER, FOR SUSPENSION ORAL
Qty: 30 PACKET | Refills: 3

## 2024-09-13 RX ORDER — BUDESONIDE 0.5 MG/2ML
SUSPENSION RESPIRATORY (INHALATION)
Qty: 0 | Refills: 0 | Status: ACTIVE | COMMUNITY
Start: 1900-01-01

## 2024-09-13 RX ORDER — NYSTATIN CREAM 100000 [USP'U]/G
APPLY TO THE AFFECTED AREA AROUND GASTROSTOMY TWICE DAILY PRN CREAM TOPICAL TWICE A DAY
Qty: 1 | Refills: 6

## 2024-09-13 RX ORDER — TRIHEXYPHENIDYL HYDROCHLORIDE 2 MG/1
TABLET ORAL
Qty: 0 | Refills: 0 | Status: ACTIVE | COMMUNITY
Start: 1900-01-01

## 2024-09-13 RX ORDER — POLYETHYLENE GLYCOL 3350 17 G/17G
MIX ONE CAPFUL POWDER, FOR SOLUTION ORAL ONCE DAILY
Qty: 510 | Refills: 3 | Status: ACTIVE | COMMUNITY

## 2024-09-13 RX ORDER — FLUTICASONE PROPIONATE 50 UG/1
SPRAY, METERED NASAL
Qty: 0 | Refills: 0 | Status: ACTIVE | COMMUNITY
Start: 1900-01-01

## 2024-09-13 RX ORDER — DIAZEPAM 2 MG/1
TABLET ORAL
Qty: 0 | Refills: 0 | Status: ACTIVE | COMMUNITY
Start: 1900-01-01

## 2024-09-13 RX ORDER — MONTELUKAST SODIUM 5 MG/1
CHEW 2 TABLETS (10 MG) BY ORAL ROUTE ONCE DAILY IN THE EVENING TABLET, CHEWABLE ORAL 1
Qty: 0 | Refills: 0 | Status: ACTIVE | COMMUNITY
Start: 1900-01-01

## 2024-09-13 RX ORDER — CETIRIZINE HYDROCHLORIDE 5 MG/1
TABLET ORAL
Qty: 0 | Refills: 0 | Status: ACTIVE | COMMUNITY
Start: 1900-01-01

## 2024-09-13 NOTE — HPI-TODAY'S VISIT:
patient has significant cerebral palsy with dysphagia and is dependent on a button type gastrostomy for feedings.  He returns to the office today for follow-up in to exchange his gastrostomy.  He is accompanied by his mom and grandfather today.  GERD seems to be well controlled zegerid -  his mother tells me that recent blood work showed high iron and high sodium.  They have increased his free water flushes.  Continues to tolerate feedings well -  02/08/2023: Karson comes with his mother and grandfather today for gastrostomy tube exchange.  His company recently change regarding the supplying of his gastrostomy tube feeding equipment. -  05/10/2023:  Karson comes to the office today with his mom and grandfather for gastrostomy Tube exchange. he has been recently seen by Dr. Abarca and some lab work has been drawn showing some elevation of hemoglobin.  He is scheduled to see Hematology about that -  08/11/2023: The patient comes to the office with his mom and grandfather for gastrostomy tube exchange.  He is doing very well from a GI perspective.  He is tolerating tube feedings well.  He is not having any evidence of refractory acid reflux disease.  Bowel movements are normal. - 11/8/2023.  Patient comes for gastrostomy tube change.  He continues to do well.  Weight is stable.  Acid reflux seems to be controlled on current antisecretory therapy. -  02/09/2024: Patient comes to the office with his mother and grandfather for regularly scheduled gastrostomy tube exchange.  He is doing actually quite well.   his mother reports that he has recently seen Dr. Kate Tillman and was diagnosed with hemochromatosis for which he started undergoing phlebotomy.  Also, patient has some concerns for sleep apnea and they are working to have a sleep study done. -  05/10/2024: The patient comes to the office today for follow-up.  He has significant cerebral palsy and is dependent on a button type gastrostomy for feeding.  He also has acid reflux disease which is historically been controlled on is a grid.  The patient reports mother reports that he is tolerating tube feeds and having a bowel movement about once a day. .  He comes for routine exchange of the gastrostomy. - 9/13/2024:The patient comes to the office with his mother and grandfather for gastrostomy tube change.  He seems to be doing well and tolerating tube feeds.

## 2024-10-29 ENCOUNTER — TELEPHONE ENCOUNTER (OUTPATIENT)
Dept: RURAL CLINIC 8 | Facility: CLINIC | Age: 24
End: 2024-10-29

## 2024-12-05 ENCOUNTER — TELEPHONE ENCOUNTER (OUTPATIENT)
Dept: URBAN - METROPOLITAN AREA CLINIC 105 | Facility: CLINIC | Age: 24
End: 2024-12-05

## 2024-12-05 RX ORDER — OMEPRAZOLE/SODIUM BICARBONATE 40; 1680 MG/1; MG/1
GIVE ONE PACKET VIA G-TUBE EVERY DAY POWDER, FOR SUSPENSION ORAL
Qty: 30 PACKET | Refills: 3

## 2025-01-10 ENCOUNTER — OFFICE VISIT (OUTPATIENT)
Dept: RURAL CLINIC 8 | Facility: CLINIC | Age: 25
End: 2025-01-10

## 2025-01-15 ENCOUNTER — OFFICE VISIT (OUTPATIENT)
Dept: RURAL CLINIC 8 | Facility: CLINIC | Age: 25
End: 2025-01-15
Payer: MEDICAID

## 2025-01-15 DIAGNOSIS — Z43.1 ATTENTION TO G-TUBE: ICD-10-CM

## 2025-01-15 DIAGNOSIS — K59.09 CHRONIC CONSTIPATION: ICD-10-CM

## 2025-01-15 DIAGNOSIS — G40.909 SEIZURE DISORDER: ICD-10-CM

## 2025-01-15 DIAGNOSIS — G80.9 CONGENITAL CEREBRAL PALSY: ICD-10-CM

## 2025-01-15 DIAGNOSIS — R13.12 OROPHARYNGEAL DYSPHAGIA: ICD-10-CM

## 2025-01-15 DIAGNOSIS — G47.33 OSA ON CPAP: ICD-10-CM

## 2025-01-15 DIAGNOSIS — K21.9 CHRONIC GERD: ICD-10-CM

## 2025-01-15 DIAGNOSIS — D75.1 ERYTHROCYTOSIS: ICD-10-CM

## 2025-01-15 PROBLEM — 127062003: Status: ACTIVE | Noted: 2025-01-15

## 2025-01-15 PROBLEM — 78275009: Status: ACTIVE | Noted: 2025-01-15

## 2025-01-15 PROBLEM — 128188000: Status: ACTIVE | Noted: 2025-01-15

## 2025-01-15 PROCEDURE — 43762 RPLC GTUBE NO REVJ TRC: CPT | Performed by: INTERNAL MEDICINE

## 2025-01-15 PROCEDURE — 99213 OFFICE O/P EST LOW 20 MIN: CPT | Performed by: INTERNAL MEDICINE

## 2025-01-15 PROCEDURE — 43763 RPLC GTUBE REVJ GSTRST TRC: CPT | Performed by: INTERNAL MEDICINE

## 2025-01-15 RX ORDER — BUDESONIDE 0.5 MG/2ML
SUSPENSION RESPIRATORY (INHALATION)
Qty: 0 | Refills: 0 | Status: ACTIVE | COMMUNITY
Start: 1900-01-01

## 2025-01-15 RX ORDER — POLYETHYLENE GLYCOL 3350 17 G/17G
MIX ONE CAPFUL POWDER, FOR SOLUTION ORAL ONCE DAILY
Qty: 510 | Refills: 3
End: 2026-01-10

## 2025-01-15 RX ORDER — POLYETHYLENE GLYCOL 3350 17 G/17G
MIX ONE CAPFUL POWDER, FOR SOLUTION ORAL ONCE DAILY
Qty: 510 | Refills: 3 | Status: ACTIVE | COMMUNITY
End: 2025-09-08

## 2025-01-15 RX ORDER — NYSTATIN CREAM 100000 [USP'U]/G
APPLY TO THE AFFECTED AREA AROUND GASTROSTOMY TWICE DAILY PRN CREAM TOPICAL TWICE A DAY
Qty: 1 | Refills: 6 | Status: ACTIVE | COMMUNITY

## 2025-01-15 RX ORDER — OMEPRAZOLE/SODIUM BICARBONATE 40; 1680 MG/1; MG/1
GIVE ONE PACKET VIA G-TUBE EVERY DAY POWDER, FOR SUSPENSION ORAL
Qty: 30 PACKET | Refills: 3 | Status: ACTIVE | COMMUNITY

## 2025-01-15 RX ORDER — DIAZEPAM 2 MG/1
TABLET ORAL
Qty: 0 | Refills: 0 | Status: ACTIVE | COMMUNITY
Start: 1900-01-01

## 2025-01-15 RX ORDER — CARBIDOPA TABLETS 25 MG/1
TABLET ORAL
Qty: 0 | Refills: 0 | Status: ACTIVE | COMMUNITY
Start: 1900-01-01

## 2025-01-15 RX ORDER — MONTELUKAST SODIUM 5 MG/1
CHEW 2 TABLETS (10 MG) BY ORAL ROUTE ONCE DAILY IN THE EVENING TABLET, CHEWABLE ORAL 1
Qty: 0 | Refills: 0 | Status: ACTIVE | COMMUNITY
Start: 1900-01-01

## 2025-01-15 RX ORDER — TRIHEXYPHENIDYL HYDROCHLORIDE 2 MG/1
TABLET ORAL
Qty: 0 | Refills: 0 | Status: ACTIVE | COMMUNITY
Start: 1900-01-01

## 2025-01-15 RX ORDER — CETIRIZINE HYDROCHLORIDE 5 MG/1
TABLET ORAL
Qty: 0 | Refills: 0 | Status: ACTIVE | COMMUNITY
Start: 1900-01-01

## 2025-01-15 RX ORDER — ECONAZOLE NITRATE 10 MG/G
APPLY TO THE AFFECTED AND SURROUNDING AREAS OF SKIN BY TOPICAL ROUTE 2 TIMES PER DAY IN THE MORNING AND EVENING CREAM TOPICAL 2
Qty: 1 | Refills: 2 | Status: ACTIVE | COMMUNITY
Start: 2019-11-14

## 2025-01-15 RX ORDER — FLUTICASONE PROPIONATE 50 UG/1
SPRAY, METERED NASAL
Qty: 0 | Refills: 0 | Status: ACTIVE | COMMUNITY
Start: 1900-01-01

## 2025-01-15 RX ORDER — LEUCOVORIN CALCIUM 10 MG/1
TABLET ORAL
Qty: 0 | Refills: 0 | Status: ACTIVE | COMMUNITY
Start: 1900-01-01

## 2025-01-15 NOTE — HPI-TODAY'S VISIT:
patient has significant cerebral palsy with dysphagia and is dependent on a button type gastrostomy for feedings.  He returns to the office today for follow-up in to exchange his gastrostomy.  He is accompanied by his mom and grandfather today.  GERD seems to be well controlled zegerid -  his mother tells me that recent blood work showed high iron and high sodium.  They have increased his free water flushes.  Continues to tolerate feedings well -  02/08/2023: Karson comes with his mother and grandfather today for gastrostomy tube exchange.  His company recently change regarding the supplying of his gastrostomy tube feeding equipment. -  05/10/2023:  Karson comes to the office today with his mom and grandfather for gastrostomy Tube exchange. he has been recently seen by Dr. Abarca and some lab work has been drawn showing some elevation of hemoglobin.  He is scheduled to see Hematology about that -  08/11/2023: The patient comes to the office with his mom and grandfather for gastrostomy tube exchange.  He is doing very well from a GI perspective.  He is tolerating tube feedings well.  He is not having any evidence of refractory acid reflux disease.  Bowel movements are normal. - 11/8/2023.  Patient comes for gastrostomy tube change.  He continues to do well.  Weight is stable.  Acid reflux seems to be controlled on current antisecretory therapy. -  02/09/2024: Patient comes to the office with his mother and grandfather for regularly scheduled gastrostomy tube exchange.  He is doing actually quite well.   his mother reports that he has recently seen Dr. Kate Tillman and was diagnosed with hemochromatosis for which he started undergoing phlebotomy.  Also, patient has some concerns for sleep apnea and they are working to have a sleep study done. -  05/10/2024: The patient comes to the office today for follow-up.  He has significant cerebral palsy and is dependent on a button type gastrostomy for feeding.  He also has acid reflux disease which is historically been controlled on is a grid.  The patient reports mother reports that he is tolerating tube feeds and having a bowel movement about once a day. .  He comes for routine exchange of the gastrostomy. - 9/13/2024:The patient comes to the office with his mother and grandfather for gastrostomy tube change.  He seems to be doing well and tolerating tube feeds. - 1/15/2020: Patient comes to the office for follow-up.  He is accompanied by his mother and grandfather.  He still  Undergoing workup and intermittent phlebotomy for erythrocytosis.  His calculated BMI today is 24.2.  He is tolerating tube feedings well.

## 2025-03-12 ENCOUNTER — TELEPHONE ENCOUNTER (OUTPATIENT)
Dept: URBAN - METROPOLITAN AREA CLINIC 105 | Facility: CLINIC | Age: 25
End: 2025-03-12

## 2025-03-12 RX ORDER — NYSTATIN CREAM 100000 [USP'U]/G
APPLY TO THE AFFECTED AREA AROUND GASTROSTOMY TWICE DAILY PRN CREAM TOPICAL TWICE A DAY
Qty: 1 | Refills: 6
End: 2025-10-08

## 2025-04-30 ENCOUNTER — OFFICE VISIT (OUTPATIENT)
Dept: RURAL CLINIC 8 | Facility: CLINIC | Age: 25
End: 2025-04-30

## 2025-04-30 RX ORDER — BUDESONIDE 0.5 MG/2ML
SUSPENSION RESPIRATORY (INHALATION)
Qty: 0 | Refills: 0 | Status: ACTIVE | COMMUNITY
Start: 1900-01-01

## 2025-04-30 RX ORDER — DIAZEPAM 2 MG/1
TABLET ORAL
Qty: 0 | Refills: 0 | Status: ACTIVE | COMMUNITY
Start: 1900-01-01

## 2025-04-30 RX ORDER — CETIRIZINE HYDROCHLORIDE 5 MG/1
TABLET ORAL
Qty: 0 | Refills: 0 | Status: ACTIVE | COMMUNITY
Start: 1900-01-01

## 2025-04-30 RX ORDER — LEUCOVORIN CALCIUM 10 MG/1
TABLET ORAL
Qty: 0 | Refills: 0 | Status: ACTIVE | COMMUNITY
Start: 1900-01-01

## 2025-04-30 RX ORDER — CARBIDOPA TABLETS 25 MG/1
TABLET ORAL
Qty: 0 | Refills: 0 | Status: ACTIVE | COMMUNITY
Start: 1900-01-01

## 2025-04-30 RX ORDER — FLUTICASONE PROPIONATE 50 UG/1
SPRAY, METERED NASAL
Qty: 0 | Refills: 0 | Status: ACTIVE | COMMUNITY
Start: 1900-01-01

## 2025-04-30 RX ORDER — ECONAZOLE NITRATE 10 MG/G
APPLY TO THE AFFECTED AND SURROUNDING AREAS OF SKIN BY TOPICAL ROUTE 2 TIMES PER DAY IN THE MORNING AND EVENING CREAM TOPICAL 2
Qty: 1 | Refills: 2 | Status: ACTIVE | COMMUNITY
Start: 2019-11-14

## 2025-04-30 RX ORDER — MONTELUKAST SODIUM 5 MG/1
CHEW 2 TABLETS (10 MG) BY ORAL ROUTE ONCE DAILY IN THE EVENING TABLET, CHEWABLE ORAL 1
Qty: 0 | Refills: 0 | Status: ACTIVE | COMMUNITY
Start: 1900-01-01

## 2025-04-30 RX ORDER — POLYETHYLENE GLYCOL 3350 17 G/17G
MIX ONE CAPFUL POWDER, FOR SOLUTION ORAL ONCE DAILY
Qty: 510 | Refills: 3 | Status: ACTIVE | COMMUNITY
End: 2026-01-10

## 2025-04-30 RX ORDER — OMEPRAZOLE/SODIUM BICARBONATE 40; 1680 MG/1; MG/1
GIVE ONE PACKET VIA G-TUBE EVERY DAY POWDER, FOR SUSPENSION ORAL
Qty: 30 PACKET | Refills: 3 | Status: ACTIVE | COMMUNITY

## 2025-04-30 RX ORDER — TRIHEXYPHENIDYL HYDROCHLORIDE 2 MG/1
TABLET ORAL
Qty: 0 | Refills: 0 | Status: ACTIVE | COMMUNITY
Start: 1900-01-01

## 2025-04-30 RX ORDER — NYSTATIN CREAM 100000 [USP'U]/G
APPLY TO THE AFFECTED AREA AROUND GASTROSTOMY TWICE DAILY PRN CREAM TOPICAL TWICE A DAY
Qty: 1 | Refills: 6 | Status: ACTIVE | COMMUNITY
End: 2025-10-08

## 2025-04-30 NOTE — HPI-TODAY'S VISIT:
patient has significant cerebral palsy with dysphagia and is dependent on a button type gastrostomy for feedings.  He returns to the office today for follow-up in to exchange his gastrostomy.  He is accompanied by his mom and grandfather today.  GERD seems to be well controlled zegerid -  his mother tells me that recent blood work showed high iron and high sodium.  They have increased his free water flushes.  Continues to tolerate feedings well -  02/08/2023: Karson comes with his mother and grandfather today for gastrostomy tube exchange.  His company recently change regarding the supplying of his gastrostomy tube feeding equipment. -  05/10/2023:  Karson comes to the office today with his mom and grandfather for gastrostomy Tube exchange. he has been recently seen by Dr. Abarca and some lab work has been drawn showing some elevation of hemoglobin.  He is scheduled to see Hematology about that -  08/11/2023: The patient comes to the office with his mom and grandfather for gastrostomy tube exchange.  He is doing very well from a GI perspective.  He is tolerating tube feedings well.  He is not having any evidence of refractory acid reflux disease.  Bowel movements are normal. - 11/8/2023.  Patient comes for gastrostomy tube change.  He continues to do well.  Weight is stable.  Acid reflux seems to be controlled on current antisecretory therapy. -  02/09/2024: Patient comes to the office with his mother and grandfather for regularly scheduled gastrostomy tube exchange.  He is doing actually quite well.   his mother reports that he has recently seen Dr. Kate Tillman and was diagnosed with hemochromatosis for which he started undergoing phlebotomy.  Also, patient has some concerns for sleep apnea and they are working to have a sleep study done. -  05/10/2024: The patient comes to the office today for follow-up.  He has significant cerebral palsy and is dependent on a button type gastrostomy for feeding.  He also has acid reflux disease which is historically been controlled on is a grid.  The patient reports mother reports that he is tolerating tube feeds and having a bowel movement about once a day. .  He comes for routine exchange of the gastrostomy. - 9/13/2024:The patient comes to the office with his mother and grandfather for gastrostomy tube change.  He seems to be doing well and tolerating tube feeds. - 1/15/2020: Patient comes to the office for follow-up.  He is accompanied by his mother and grandfather.  He still  Undergoing workup and intermittent phlebotomy for erythrocytosis.  His calculated BMI today is 24.2.  He is tolerating tube feedings well. - 4/30/2025:The patient comes to the office today accompanied by his mother and grandfather.  He has cerebral palsy and is dependent on a button type gastrostomy for feedings.  They did not get the shipment in the end of his replacement gastrostomy in time for today's visit.  He is doing very well and continues to follow with hematology regarding erythrocytosis.  He is also on CPAP for sleep apnea.  Is tolerating tube feedings well with no problems.

## 2025-07-02 ENCOUNTER — TELEPHONE ENCOUNTER (OUTPATIENT)
Dept: URBAN - METROPOLITAN AREA CLINIC 105 | Facility: CLINIC | Age: 25
End: 2025-07-02

## 2025-07-29 ENCOUNTER — TELEPHONE ENCOUNTER (OUTPATIENT)
Dept: URBAN - METROPOLITAN AREA CLINIC 105 | Facility: CLINIC | Age: 25
End: 2025-07-29

## 2025-07-29 RX ORDER — OMEPRAZOLE/SODIUM BICARBONATE 40; 1680 MG/1; MG/1
GIVE ONE PACKET VIA G-TUBE EVERY DAY POWDER, FOR SUSPENSION ORAL DAILY
Qty: 90 | Refills: 3

## 2025-07-30 ENCOUNTER — OFFICE VISIT (OUTPATIENT)
Dept: RURAL CLINIC 8 | Facility: CLINIC | Age: 25
End: 2025-07-30
Payer: MEDICAID

## 2025-07-30 DIAGNOSIS — G40.909 SEIZURE DISORDER: ICD-10-CM

## 2025-07-30 DIAGNOSIS — Z93.1 GASTROSTOMY IN PLACE: ICD-10-CM

## 2025-07-30 DIAGNOSIS — G80.1 SPASTIC DIPLEGIC CEREBRAL PALSY: ICD-10-CM

## 2025-07-30 DIAGNOSIS — G80.9 CEREBRAL PALSY: ICD-10-CM

## 2025-07-30 DIAGNOSIS — E83.110 HEREDITARY HEMOCHROMATOSIS: ICD-10-CM

## 2025-07-30 DIAGNOSIS — G47.33 OSA ON CPAP: ICD-10-CM

## 2025-07-30 DIAGNOSIS — D75.1 ERYTHROCYTOSIS: ICD-10-CM

## 2025-07-30 DIAGNOSIS — R13.10 DYSPHAGIA: ICD-10-CM

## 2025-07-30 DIAGNOSIS — G80.9 CEREBRAL PALSY, UNSPECIFIED TYPE: ICD-10-CM

## 2025-07-30 DIAGNOSIS — Z43.1 ATTENTION TO GASTROSTOMY TUBE: ICD-10-CM

## 2025-07-30 PROCEDURE — 43763 RPLC GTUBE REVJ GSTRST TRC: CPT | Performed by: INTERNAL MEDICINE

## 2025-07-30 PROCEDURE — 99213 OFFICE O/P EST LOW 20 MIN: CPT | Performed by: INTERNAL MEDICINE

## 2025-07-30 RX ORDER — TRIHEXYPHENIDYL HYDROCHLORIDE 2 MG/1
TABLET ORAL
Qty: 0 | Refills: 0 | Status: ACTIVE | COMMUNITY
Start: 1900-01-01

## 2025-07-30 RX ORDER — LEUCOVORIN CALCIUM 10 MG/1
TABLET ORAL
Qty: 0 | Refills: 0 | Status: ACTIVE | COMMUNITY
Start: 1900-01-01

## 2025-07-30 RX ORDER — OMEPRAZOLE/SODIUM BICARBONATE 40; 1680 MG/1; MG/1
GIVE ONE PACKET VIA G-TUBE EVERY DAY POWDER, FOR SUSPENSION ORAL
Qty: 30 PACKET | Refills: 3 | Status: ACTIVE | COMMUNITY

## 2025-07-30 RX ORDER — ECONAZOLE NITRATE 10 MG/G
APPLY TO THE AFFECTED AND SURROUNDING AREAS OF SKIN BY TOPICAL ROUTE 2 TIMES PER DAY IN THE MORNING AND EVENING CREAM TOPICAL 2
Qty: 1 | Refills: 2 | Status: ACTIVE | COMMUNITY
Start: 2019-11-14

## 2025-07-30 RX ORDER — FLUTICASONE PROPIONATE 50 UG/1
SPRAY, METERED NASAL
Qty: 0 | Refills: 0 | Status: ACTIVE | COMMUNITY
Start: 1900-01-01

## 2025-07-30 RX ORDER — POLYETHYLENE GLYCOL 3350 17 G/17G
MIX ONE CAPFUL POWDER, FOR SOLUTION ORAL ONCE DAILY
Qty: 510 | Refills: 3 | Status: ACTIVE | COMMUNITY
End: 2026-01-10

## 2025-07-30 RX ORDER — MONTELUKAST SODIUM 5 MG/1
CHEW 2 TABLETS (10 MG) BY ORAL ROUTE ONCE DAILY IN THE EVENING TABLET, CHEWABLE ORAL 1
Qty: 0 | Refills: 0 | Status: ACTIVE | COMMUNITY
Start: 1900-01-01

## 2025-07-30 RX ORDER — DIAZEPAM 2 MG/1
TABLET ORAL
Qty: 0 | Refills: 0 | Status: ACTIVE | COMMUNITY
Start: 1900-01-01

## 2025-07-30 RX ORDER — NYSTATIN CREAM 100000 [USP'U]/G
APPLY TO THE AFFECTED AREA AROUND GASTROSTOMY TWICE DAILY PRN CREAM TOPICAL TWICE A DAY
Qty: 1 | Refills: 6 | Status: ACTIVE | COMMUNITY
End: 2025-10-08

## 2025-07-30 RX ORDER — CARBIDOPA TABLETS 25 MG/1
TABLET ORAL
Qty: 0 | Refills: 0 | Status: ACTIVE | COMMUNITY
Start: 1900-01-01

## 2025-07-30 RX ORDER — CETIRIZINE HYDROCHLORIDE 5 MG/1
TABLET ORAL
Qty: 0 | Refills: 0 | Status: ACTIVE | COMMUNITY
Start: 1900-01-01

## 2025-07-30 RX ORDER — BUDESONIDE 0.5 MG/2ML
SUSPENSION RESPIRATORY (INHALATION)
Qty: 0 | Refills: 0 | Status: ACTIVE | COMMUNITY
Start: 1900-01-01

## 2025-07-30 NOTE — HPI-TODAY'S VISIT:
patient has significant cerebral palsy with dysphagia and is dependent on a button type gastrostomy for feedings.  He returns to the office today for follow-up in to exchange his gastrostomy.  He is accompanied by his mom and grandfather today.  GERD seems to be well controlled zegerid -  his mother tells me that recent blood work showed high iron and high sodium.  They have increased his free water flushes.  Continues to tolerate feedings well -  02/08/2023: Karson comes with his mother and grandfather today for gastrostomy tube exchange.  His company recently change regarding the supplying of his gastrostomy tube feeding equipment. -  05/10/2023:  Karson comes to the office today with his mom and grandfather for gastrostomy Tube exchange. he has been recently seen by Dr. Abarca and some lab work has been drawn showing some elevation of hemoglobin.  He is scheduled to see Hematology about that -  08/11/2023: The patient comes to the office with his mom and grandfather for gastrostomy tube exchange.  He is doing very well from a GI perspective.  He is tolerating tube feedings well.  He is not having any evidence of refractory acid reflux disease.  Bowel movements are normal. - 11/8/2023.  Patient comes for gastrostomy tube change.  He continues to do well.  Weight is stable.  Acid reflux seems to be controlled on current antisecretory therapy. -  02/09/2024: Patient comes to the office with his mother and grandfather for regularly scheduled gastrostomy tube exchange.  He is doing actually quite well.   his mother reports that he has recently seen Dr. Kate Tillman and was diagnosed with hemochromatosis for which he started undergoing phlebotomy.  Also, patient has some concerns for sleep apnea and they are working to have a sleep study done. -  05/10/2024: The patient comes to the office today for follow-up.  He has significant cerebral palsy and is dependent on a button type gastrostomy for feeding.  He also has acid reflux disease which is historically been controlled on is a grid.  The patient reports mother reports that he is tolerating tube feeds and having a bowel movement about once a day. .  He comes for routine exchange of the gastrostomy. - 9/13/2024:The patient comes to the office with his mother and grandfather for gastrostomy tube change.  He seems to be doing well and tolerating tube feeds. - 1/15/2020: Patient comes to the office for follow-up.  He is accompanied by his mother and grandfather.  He still  Undergoing workup and intermittent phlebotomy for erythrocytosis.  His calculated BMI today is 24.2.  He is tolerating tube feedings well. - 4/30/2025:The patient comes to the office today accompanied by his mother and grandfather.  He has cerebral palsy and is dependent on a button type gastrostomy for feedings.  They did not get the shipment in the end of his replacement gastrostomy in time for today's visit.  He is doing very well and continues to follow with hematology regarding erythrocytosis.  He is also on CPAP for sleep apnea.  Is tolerating tube feedings well with no problems. - 7/30/2025: